# Patient Record
Sex: FEMALE | Race: OTHER | HISPANIC OR LATINO | Employment: OTHER | URBAN - METROPOLITAN AREA
[De-identification: names, ages, dates, MRNs, and addresses within clinical notes are randomized per-mention and may not be internally consistent; named-entity substitution may affect disease eponyms.]

---

## 2019-02-11 ENCOUNTER — HOSPITAL ENCOUNTER (INPATIENT)
Facility: HOSPITAL | Age: 44
LOS: 2 days | Discharge: HOME | DRG: 811 | End: 2019-02-13
Attending: EMERGENCY MEDICINE | Admitting: INTERNAL MEDICINE
Payer: COMMERCIAL

## 2019-02-11 ENCOUNTER — APPOINTMENT (EMERGENCY)
Dept: RADIOLOGY | Facility: HOSPITAL | Age: 44
DRG: 811 | End: 2019-02-11
Payer: COMMERCIAL

## 2019-02-11 DIAGNOSIS — J45.901 REACTIVE AIRWAY DISEASE WITH ACUTE EXACERBATION, UNSPECIFIED ASTHMA SEVERITY, UNSPECIFIED WHETHER PERSISTENT: ICD-10-CM

## 2019-02-11 DIAGNOSIS — R06.00 DYSPNEA: ICD-10-CM

## 2019-02-11 DIAGNOSIS — T78.2XXA ANAPHYLAXIS: Primary | ICD-10-CM

## 2019-02-11 LAB
ALBUMIN SERPL BCP-MCNC: 4.1 G/DL (ref 3.5–5)
ALP SERPL-CCNC: 89 U/L (ref 46–116)
ALT SERPL W P-5'-P-CCNC: 31 U/L (ref 12–78)
ANION GAP SERPL CALCULATED.3IONS-SCNC: 8 MMOL/L (ref 4–13)
AST SERPL W P-5'-P-CCNC: 15 U/L (ref 5–45)
ATRIAL RATE: 131 BPM
BASE EX.OXY STD BLDV CALC-SCNC: 79.1 % (ref 60–80)
BASE EX.OXY STD BLDV CALC-SCNC: 80.1 % (ref 60–80)
BASE EXCESS BLDA CALC-SCNC: 1 MMOL/L (ref -2–3)
BASE EXCESS BLDV CALC-SCNC: -0.6 MMOL/L
BASE EXCESS BLDV CALC-SCNC: -4.1 MMOL/L
BASOPHILS # BLD AUTO: 0.03 THOUSANDS/ΜL (ref 0–0.1)
BASOPHILS NFR BLD AUTO: 0 % (ref 0–1)
BILIRUB SERPL-MCNC: 0.27 MG/DL (ref 0.2–1)
BILIRUB UR QL STRIP: NEGATIVE
BUN SERPL-MCNC: 13 MG/DL (ref 5–25)
CA-I BLD-SCNC: 1.17 MMOL/L (ref 1.12–1.32)
CALCIUM SERPL-MCNC: 9.1 MG/DL (ref 8.3–10.1)
CHLORIDE SERPL-SCNC: 103 MMOL/L (ref 100–108)
CLARITY UR: CLEAR
CO2 SERPL-SCNC: 27 MMOL/L (ref 21–32)
COLOR UR: YELLOW
COLOR, POC: 0
CREAT SERPL-MCNC: 1.15 MG/DL (ref 0.6–1.3)
EOSINOPHIL # BLD AUTO: 0 THOUSAND/ΜL (ref 0–0.61)
EOSINOPHIL NFR BLD AUTO: 0 % (ref 0–6)
ERYTHROCYTE [DISTWIDTH] IN BLOOD BY AUTOMATED COUNT: 13.8 % (ref 11.6–15.1)
EXT PREG TEST URINE: NORMAL
GFR SERPL CREATININE-BSD FRML MDRD: 58 ML/MIN/1.73SQ M
GLUCOSE SERPL-MCNC: 116 MG/DL (ref 65–140)
GLUCOSE SERPL-MCNC: 126 MG/DL (ref 65–140)
GLUCOSE UR STRIP-MCNC: NEGATIVE MG/DL
HCO3 BLDA-SCNC: 27 MMOL/L (ref 24–30)
HCO3 BLDV-SCNC: 23 MMOL/L (ref 24–30)
HCO3 BLDV-SCNC: 26.5 MMOL/L (ref 24–30)
HCT VFR BLD AUTO: 47.4 % (ref 34.8–46.1)
HCT VFR BLD CALC: 48 % (ref 34.8–46.1)
HGB BLD-MCNC: 15.2 G/DL (ref 11.5–15.4)
HGB BLDA-MCNC: 16.3 G/DL (ref 11.5–15.4)
HGB UR QL STRIP.AUTO: NEGATIVE
IMM GRANULOCYTES # BLD AUTO: 0.02 THOUSAND/UL (ref 0–0.2)
IMM GRANULOCYTES NFR BLD AUTO: 0 % (ref 0–2)
KETONES UR STRIP-MCNC: NEGATIVE MG/DL
LEUKOCYTE ESTERASE UR QL STRIP: NEGATIVE
LYMPHOCYTES # BLD AUTO: 5.42 THOUSANDS/ΜL (ref 0.6–4.47)
LYMPHOCYTES NFR BLD AUTO: 61 % (ref 14–44)
MCH RBC QN AUTO: 28.1 PG (ref 26.8–34.3)
MCHC RBC AUTO-ENTMCNC: 32.1 G/DL (ref 31.4–37.4)
MCV RBC AUTO: 88 FL (ref 82–98)
MONOCYTES # BLD AUTO: 0.62 THOUSAND/ΜL (ref 0.17–1.22)
MONOCYTES NFR BLD AUTO: 7 % (ref 4–12)
NEUTROPHILS # BLD AUTO: 2.89 THOUSANDS/ΜL (ref 1.85–7.62)
NEUTS SEG NFR BLD AUTO: 32 % (ref 43–75)
NITRITE UR QL STRIP: NEGATIVE
NRBC BLD AUTO-RTO: 0 /100 WBCS
O2 CT BLDV-SCNC: 17.6 ML/DL
O2 CT BLDV-SCNC: 17.6 ML/DL
P AXIS: 126 DEGREES
PCO2 BLD: 28 MMOL/L (ref 21–32)
PCO2 BLD: 45.5 MM HG (ref 42–50)
PCO2 BLDV: 49.2 MM HG (ref 42–50)
PCO2 BLDV: 52.4 MM HG (ref 42–50)
PH BLD: 7.38 [PH] (ref 7.3–7.4)
PH BLDV: 7.29 [PH] (ref 7.3–7.4)
PH BLDV: 7.32 [PH] (ref 7.3–7.4)
PH UR STRIP.AUTO: 6 [PH] (ref 4.5–8)
PLATELET # BLD AUTO: 330 THOUSANDS/UL (ref 149–390)
PMV BLD AUTO: 9 FL (ref 8.9–12.7)
PO2 BLD: 41 MM HG (ref 35–45)
PO2 BLDV: 48.3 MM HG (ref 35–45)
PO2 BLDV: 51.1 MM HG (ref 35–45)
POTASSIUM BLD-SCNC: 2.7 MMOL/L (ref 3.5–5.3)
POTASSIUM SERPL-SCNC: 2.7 MMOL/L (ref 3.5–5.3)
PR INTERVAL: 132 MS
PROT SERPL-MCNC: 8.8 G/DL (ref 6.4–8.2)
PROT UR STRIP-MCNC: NEGATIVE MG/DL
QRS AXIS: 211 DEGREES
QRSD INTERVAL: 84 MS
QT INTERVAL: 302 MS
QTC INTERVAL: 445 MS
RBC # BLD AUTO: 5.4 MILLION/UL (ref 3.81–5.12)
SAO2 % BLD FROM PO2: 75 % (ref 95–98)
SODIUM BLD-SCNC: 142 MMOL/L (ref 136–145)
SODIUM SERPL-SCNC: 138 MMOL/L (ref 136–145)
SP GR UR STRIP.AUTO: <=1.005 (ref 1–1.03)
SPECIMEN SOURCE: ABNORMAL
T WAVE AXIS: 122 DEGREES
TROPONIN I SERPL-MCNC: <0.02 NG/ML
UROBILINOGEN UR QL STRIP.AUTO: 0.2 E.U./DL
VENTRICULAR RATE: 131 BPM
WBC # BLD AUTO: 8.98 THOUSAND/UL (ref 4.31–10.16)

## 2019-02-11 PROCEDURE — 82947 ASSAY GLUCOSE BLOOD QUANT: CPT

## 2019-02-11 PROCEDURE — 85025 COMPLETE CBC W/AUTO DIFF WBC: CPT | Performed by: EMERGENCY MEDICINE

## 2019-02-11 PROCEDURE — 82803 BLOOD GASES ANY COMBINATION: CPT

## 2019-02-11 PROCEDURE — 93005 ELECTROCARDIOGRAM TRACING: CPT

## 2019-02-11 PROCEDURE — 93010 ELECTROCARDIOGRAM REPORT: CPT | Performed by: INTERNAL MEDICINE

## 2019-02-11 PROCEDURE — 96360 HYDRATION IV INFUSION INIT: CPT

## 2019-02-11 PROCEDURE — 71045 X-RAY EXAM CHEST 1 VIEW: CPT

## 2019-02-11 PROCEDURE — 82805 BLOOD GASES W/O2 SATURATION: CPT | Performed by: EMERGENCY MEDICINE

## 2019-02-11 PROCEDURE — 36415 COLL VENOUS BLD VENIPUNCTURE: CPT | Performed by: EMERGENCY MEDICINE

## 2019-02-11 PROCEDURE — 84484 ASSAY OF TROPONIN QUANT: CPT | Performed by: EMERGENCY MEDICINE

## 2019-02-11 PROCEDURE — 82330 ASSAY OF CALCIUM: CPT

## 2019-02-11 PROCEDURE — 85014 HEMATOCRIT: CPT

## 2019-02-11 PROCEDURE — 96374 THER/PROPH/DIAG INJ IV PUSH: CPT

## 2019-02-11 PROCEDURE — 94644 CONT INHLJ TX 1ST HOUR: CPT

## 2019-02-11 PROCEDURE — 84132 ASSAY OF SERUM POTASSIUM: CPT

## 2019-02-11 PROCEDURE — 99285 EMERGENCY DEPT VISIT HI MDM: CPT

## 2019-02-11 PROCEDURE — 81003 URINALYSIS AUTO W/O SCOPE: CPT

## 2019-02-11 PROCEDURE — 84295 ASSAY OF SERUM SODIUM: CPT

## 2019-02-11 PROCEDURE — 81025 URINE PREGNANCY TEST: CPT | Performed by: EMERGENCY MEDICINE

## 2019-02-11 PROCEDURE — 80053 COMPREHEN METABOLIC PANEL: CPT | Performed by: EMERGENCY MEDICINE

## 2019-02-11 PROCEDURE — 94660 CPAP INITIATION&MGMT: CPT

## 2019-02-11 PROCEDURE — 94760 N-INVAS EAR/PLS OXIMETRY 1: CPT

## 2019-02-11 PROCEDURE — 81002 URINALYSIS NONAUTO W/O SCOPE: CPT | Performed by: EMERGENCY MEDICINE

## 2019-02-11 RX ORDER — SODIUM CHLORIDE FOR INHALATION 0.9 %
3 VIAL, NEBULIZER (ML) INHALATION ONCE
Status: COMPLETED | OUTPATIENT
Start: 2019-02-11 | End: 2019-02-11

## 2019-02-11 RX ADMIN — FAMOTIDINE 20 MG: 10 INJECTION, SOLUTION INTRAVENOUS at 22:31

## 2019-02-11 RX ADMIN — ISODIUM CHLORIDE 3 ML: 0.03 SOLUTION RESPIRATORY (INHALATION) at 22:26

## 2019-02-11 RX ADMIN — SODIUM CHLORIDE 1000 ML: 0.9 INJECTION, SOLUTION INTRAVENOUS at 23:04

## 2019-02-11 RX ADMIN — ALBUTEROL SULFATE 10 MG: 2.5 SOLUTION RESPIRATORY (INHALATION) at 22:26

## 2019-02-11 RX ADMIN — IPRATROPIUM BROMIDE 1 MG: 0.5 SOLUTION RESPIRATORY (INHALATION) at 22:26

## 2019-02-12 PROBLEM — F41.9 ANXIETY: Status: ACTIVE | Noted: 2019-02-12

## 2019-02-12 PROBLEM — J45.901 REACTIVE AIRWAY DISEASE WITH ACUTE EXACERBATION: Status: ACTIVE | Noted: 2019-02-12

## 2019-02-12 PROBLEM — I10 ESSENTIAL HYPERTENSION: Status: ACTIVE | Noted: 2019-02-12

## 2019-02-12 PROBLEM — T78.3XXA ANGIOEDEMA: Status: ACTIVE | Noted: 2019-02-12

## 2019-02-12 LAB
ANION GAP SERPL CALCULATED.3IONS-SCNC: 10 MMOL/L (ref 4–13)
BASOPHILS # BLD AUTO: 0.01 THOUSANDS/ΜL (ref 0–0.1)
BASOPHILS NFR BLD AUTO: 0 % (ref 0–1)
BUN SERPL-MCNC: 14 MG/DL (ref 5–25)
CALCIUM SERPL-MCNC: 8.9 MG/DL (ref 8.3–10.1)
CHLORIDE SERPL-SCNC: 106 MMOL/L (ref 100–108)
CO2 SERPL-SCNC: 22 MMOL/L (ref 21–32)
CREAT SERPL-MCNC: 1.17 MG/DL (ref 0.6–1.3)
EOSINOPHIL # BLD AUTO: 0 THOUSAND/ΜL (ref 0–0.61)
EOSINOPHIL NFR BLD AUTO: 0 % (ref 0–6)
ERYTHROCYTE [DISTWIDTH] IN BLOOD BY AUTOMATED COUNT: 13.8 % (ref 11.6–15.1)
EST. AVERAGE GLUCOSE BLD GHB EST-MCNC: 134 MG/DL
GFR SERPL CREATININE-BSD FRML MDRD: 57 ML/MIN/1.73SQ M
GLUCOSE SERPL-MCNC: 149 MG/DL (ref 65–140)
HBA1C MFR BLD: 6.3 % (ref 4.2–6.3)
HCT VFR BLD AUTO: 45.2 % (ref 34.8–46.1)
HGB BLD-MCNC: 14.3 G/DL (ref 11.5–15.4)
IMM GRANULOCYTES # BLD AUTO: 0.03 THOUSAND/UL (ref 0–0.2)
IMM GRANULOCYTES NFR BLD AUTO: 0 % (ref 0–2)
LYMPHOCYTES # BLD AUTO: 0.61 THOUSANDS/ΜL (ref 0.6–4.47)
LYMPHOCYTES NFR BLD AUTO: 6 % (ref 14–44)
MAGNESIUM SERPL-MCNC: 2.4 MG/DL (ref 1.6–2.6)
MCH RBC QN AUTO: 28 PG (ref 26.8–34.3)
MCHC RBC AUTO-ENTMCNC: 31.6 G/DL (ref 31.4–37.4)
MCV RBC AUTO: 89 FL (ref 82–98)
MONOCYTES # BLD AUTO: 0.06 THOUSAND/ΜL (ref 0.17–1.22)
MONOCYTES NFR BLD AUTO: 1 % (ref 4–12)
NEUTROPHILS # BLD AUTO: 9.56 THOUSANDS/ΜL (ref 1.85–7.62)
NEUTS SEG NFR BLD AUTO: 93 % (ref 43–75)
NRBC BLD AUTO-RTO: 0 /100 WBCS
PHOSPHATE SERPL-MCNC: 1.9 MG/DL (ref 2.7–4.5)
PLATELET # BLD AUTO: 295 THOUSANDS/UL (ref 149–390)
PMV BLD AUTO: 9.5 FL (ref 8.9–12.7)
POTASSIUM SERPL-SCNC: 4.3 MMOL/L (ref 3.5–5.3)
RBC # BLD AUTO: 5.1 MILLION/UL (ref 3.81–5.12)
SODIUM SERPL-SCNC: 138 MMOL/L (ref 136–145)
WBC # BLD AUTO: 10.27 THOUSAND/UL (ref 4.31–10.16)

## 2019-02-12 PROCEDURE — 99254 IP/OBS CNSLTJ NEW/EST MOD 60: CPT | Performed by: INTERNAL MEDICINE

## 2019-02-12 PROCEDURE — 83036 HEMOGLOBIN GLYCOSYLATED A1C: CPT | Performed by: INTERNAL MEDICINE

## 2019-02-12 PROCEDURE — 80048 BASIC METABOLIC PNL TOTAL CA: CPT | Performed by: INTERNAL MEDICINE

## 2019-02-12 PROCEDURE — 85025 COMPLETE CBC W/AUTO DIFF WBC: CPT | Performed by: INTERNAL MEDICINE

## 2019-02-12 PROCEDURE — 94640 AIRWAY INHALATION TREATMENT: CPT

## 2019-02-12 PROCEDURE — 83735 ASSAY OF MAGNESIUM: CPT | Performed by: INTERNAL MEDICINE

## 2019-02-12 PROCEDURE — 99223 1ST HOSP IP/OBS HIGH 75: CPT | Performed by: INTERNAL MEDICINE

## 2019-02-12 PROCEDURE — 84100 ASSAY OF PHOSPHORUS: CPT | Performed by: INTERNAL MEDICINE

## 2019-02-12 PROCEDURE — 94760 N-INVAS EAR/PLS OXIMETRY 1: CPT

## 2019-02-12 PROCEDURE — 94762 N-INVAS EAR/PLS OXIMTRY CONT: CPT

## 2019-02-12 RX ORDER — AMLODIPINE BESYLATE 5 MG/1
1 TABLET ORAL DAILY
Status: ON HOLD | COMMUNITY
Start: 2018-09-10 | End: 2019-02-13 | Stop reason: SDUPTHER

## 2019-02-12 RX ORDER — AMITRIPTYLINE HYDROCHLORIDE 50 MG/1
1 TABLET, FILM COATED ORAL
Status: ON HOLD | COMMUNITY
Start: 2018-03-26 | End: 2019-02-13 | Stop reason: SDUPTHER

## 2019-02-12 RX ORDER — AMITRIPTYLINE HYDROCHLORIDE 50 MG/1
50 TABLET, FILM COATED ORAL
Status: DISCONTINUED | OUTPATIENT
Start: 2019-02-12 | End: 2019-02-13 | Stop reason: HOSPADM

## 2019-02-12 RX ORDER — POTASSIUM CHLORIDE 20 MEQ/1
40 TABLET, EXTENDED RELEASE ORAL ONCE
Status: COMPLETED | OUTPATIENT
Start: 2019-02-12 | End: 2019-02-12

## 2019-02-12 RX ORDER — SODIUM CHLORIDE FOR INHALATION 0.9 %
3 VIAL, NEBULIZER (ML) INHALATION
Status: DISCONTINUED | OUTPATIENT
Start: 2019-02-12 | End: 2019-02-13 | Stop reason: HOSPADM

## 2019-02-12 RX ORDER — AMLODIPINE BESYLATE 5 MG/1
5 TABLET ORAL DAILY
Status: DISCONTINUED | OUTPATIENT
Start: 2019-02-12 | End: 2019-02-13 | Stop reason: HOSPADM

## 2019-02-12 RX ORDER — PREDNISONE 20 MG/1
40 TABLET ORAL DAILY
Status: DISCONTINUED | OUTPATIENT
Start: 2019-02-13 | End: 2019-02-13 | Stop reason: HOSPADM

## 2019-02-12 RX ORDER — METHYLPREDNISOLONE SODIUM SUCCINATE 40 MG/ML
40 INJECTION, POWDER, LYOPHILIZED, FOR SOLUTION INTRAMUSCULAR; INTRAVENOUS EVERY 8 HOURS SCHEDULED
Status: DISCONTINUED | OUTPATIENT
Start: 2019-02-12 | End: 2019-02-12

## 2019-02-12 RX ORDER — FLUCONAZOLE 150 MG/1
150 TABLET ORAL ONCE
Status: CANCELLED | OUTPATIENT
Start: 2019-02-12 | End: 2019-02-12

## 2019-02-12 RX ORDER — FLUCONAZOLE 150 MG/1
1 TABLET ORAL ONCE
Status: ON HOLD | COMMUNITY
Start: 2018-03-26 | End: 2019-02-12 | Stop reason: HOSPADM

## 2019-02-12 RX ORDER — LISINOPRIL AND HYDROCHLOROTHIAZIDE 25; 20 MG/1; MG/1
20-25 TABLET ORAL ONCE
Status: ON HOLD | COMMUNITY
Start: 2018-01-19 | End: 2019-02-12

## 2019-02-12 RX ORDER — METHYLPREDNISOLONE SODIUM SUCCINATE 40 MG/ML
40 INJECTION, POWDER, LYOPHILIZED, FOR SOLUTION INTRAMUSCULAR; INTRAVENOUS ONCE
Status: COMPLETED | OUTPATIENT
Start: 2019-02-12 | End: 2019-02-12

## 2019-02-12 RX ORDER — ACETAMINOPHEN 325 MG/1
650 TABLET ORAL EVERY 6 HOURS PRN
Status: DISCONTINUED | OUTPATIENT
Start: 2019-02-12 | End: 2019-02-13 | Stop reason: HOSPADM

## 2019-02-12 RX ORDER — ONDANSETRON 2 MG/ML
4 INJECTION INTRAMUSCULAR; INTRAVENOUS EVERY 8 HOURS PRN
Status: DISCONTINUED | OUTPATIENT
Start: 2019-02-12 | End: 2019-02-13 | Stop reason: HOSPADM

## 2019-02-12 RX ORDER — DOCUSATE SODIUM 100 MG/1
100 CAPSULE, LIQUID FILLED ORAL 2 TIMES DAILY PRN
Status: DISCONTINUED | OUTPATIENT
Start: 2019-02-12 | End: 2019-02-13 | Stop reason: HOSPADM

## 2019-02-12 RX ORDER — MAGNESIUM HYDROXIDE/ALUMINUM HYDROXICE/SIMETHICONE 120; 1200; 1200 MG/30ML; MG/30ML; MG/30ML
15 SUSPENSION ORAL EVERY 6 HOURS PRN
Status: DISCONTINUED | OUTPATIENT
Start: 2019-02-12 | End: 2019-02-13 | Stop reason: HOSPADM

## 2019-02-12 RX ORDER — LEVALBUTEROL 1.25 MG/.5ML
1.25 SOLUTION, CONCENTRATE RESPIRATORY (INHALATION)
Status: DISCONTINUED | OUTPATIENT
Start: 2019-02-12 | End: 2019-02-13 | Stop reason: HOSPADM

## 2019-02-12 RX ADMIN — POTASSIUM CHLORIDE 40 MEQ: 1500 TABLET, EXTENDED RELEASE ORAL at 01:22

## 2019-02-12 RX ADMIN — ISODIUM CHLORIDE 3 ML: 0.03 SOLUTION RESPIRATORY (INHALATION) at 19:12

## 2019-02-12 RX ADMIN — DIBASIC SODIUM PHOSPHATE, MONOBASIC POTASSIUM PHOSPHATE AND MONOBASIC SODIUM PHOSPHATE 1 TABLET: 852; 155; 130 TABLET ORAL at 16:32

## 2019-02-12 RX ADMIN — LEVALBUTEROL 1.25 MG: 1.25 SOLUTION, CONCENTRATE RESPIRATORY (INHALATION) at 13:59

## 2019-02-12 RX ADMIN — ISODIUM CHLORIDE 3 ML: 0.03 SOLUTION RESPIRATORY (INHALATION) at 07:40

## 2019-02-12 RX ADMIN — ACETAMINOPHEN 650 MG: 325 TABLET, FILM COATED ORAL at 15:27

## 2019-02-12 RX ADMIN — ISODIUM CHLORIDE 3 ML: 0.03 SOLUTION RESPIRATORY (INHALATION) at 13:59

## 2019-02-12 RX ADMIN — LEVALBUTEROL 1.25 MG: 1.25 SOLUTION, CONCENTRATE RESPIRATORY (INHALATION) at 07:40

## 2019-02-12 RX ADMIN — ENOXAPARIN SODIUM 40 MG: 40 INJECTION SUBCUTANEOUS at 08:39

## 2019-02-12 RX ADMIN — METHYLPREDNISOLONE SODIUM SUCCINATE 40 MG: 40 INJECTION, POWDER, FOR SOLUTION INTRAMUSCULAR; INTRAVENOUS at 21:08

## 2019-02-12 RX ADMIN — METHYLPREDNISOLONE SODIUM SUCCINATE 40 MG: 40 INJECTION, POWDER, FOR SOLUTION INTRAMUSCULAR; INTRAVENOUS at 04:17

## 2019-02-12 RX ADMIN — LEVALBUTEROL 1.25 MG: 1.25 SOLUTION, CONCENTRATE RESPIRATORY (INHALATION) at 19:12

## 2019-02-12 RX ADMIN — DIBASIC SODIUM PHOSPHATE, MONOBASIC POTASSIUM PHOSPHATE AND MONOBASIC SODIUM PHOSPHATE 1 TABLET: 852; 155; 130 TABLET ORAL at 21:08

## 2019-02-12 RX ADMIN — DIBASIC SODIUM PHOSPHATE, MONOBASIC POTASSIUM PHOSPHATE AND MONOBASIC SODIUM PHOSPHATE 1 TABLET: 852; 155; 130 TABLET ORAL at 12:49

## 2019-02-12 RX ADMIN — AMITRIPTYLINE HYDROCHLORIDE 50 MG: 50 TABLET, FILM COATED ORAL at 21:08

## 2019-02-12 RX ADMIN — AMLODIPINE BESYLATE 5 MG: 5 TABLET ORAL at 08:39

## 2019-02-12 NOTE — RESPIRATORY THERAPY NOTE
RT Protocol Note  Herrera Leslie 39 y o  female MRN: [id number]  Unit/Bed#: [facility] 708-01 Encounter: [id number]    Assessment    Principal Problem:    Reactive airway disease with acute exacerbation  Active Problems:    Anxiety    Essential hypertension      Home Pulmonary Medications:  None       Past Medical History:   Diagnosis Date    Diabetes mellitus ([address] )     Hypertension      Social History     Socioeconomic History    Marital status: /Civil Union     Spouse name: None    Number of children: None    Years of education: None    Highest education level: None   Occupational History    None   Social Needs    Financial resource strain: None    Food insecurity:     Worry: None     Inability: None    Transportation needs:     Medical: None     Non-medical: None   Tobacco Use    Smoking status: Never Smoker    Smokeless tobacco: Never Used   Substance and Sexual Activity    Alcohol use: Not Currently     Frequency: Monthly or less     Binge frequency: Never    Drug use: Not Currently    Sexual activity: Not Currently   Lifestyle    Physical activity:     Days per week: None     Minutes per session: None    Stress: None   Relationships    Social connections:     Talks on phone: None     Gets together: None     Attends Protestant service: None     Active member of club or organization: None     Attends meetings of clubs or organizations: None     Relationship status: None    Intimate partner violence:     Fear of current or ex partner: None     Emotionally abused: None     Physically abused: None     Forced sexual activity: None   Other Topics Concern    None   Social History Narrative    None       Subjective         Objective    Physical Exam:   Assessment Type: Assess only  General Appearance: Awake, Alert  Respiratory Pattern: Normal  Chest Assessment: Chest expansion symmetrical  Bilateral Breath Sounds: Coarse  O2 Device: RA    Vitals:  Blood pressure 116/72, pulse (!) 123, temperature 98 6 °F (37 °C), temperature source Oral, resp  rate 20, height 5' 6" (1 676 m), weight 89 5 kg (197 lb 5 oz), SpO2 98 %  Imaging and other studies: I have personally reviewed pertinent reports  O2 Device: RA     Plan    Respiratory Plan: Mild Distress pathway        Resp Comments: (P) Respiratory protocol initiated at this time  Pt admitted for SOB  Pt states she does not have any history of COPD or asthma  Per chart pt admission diagnosis is reactive airway disease  Pt does not take any respiratory meds at home  Will order pt Xopenex and Saline TID  Will continue to monitor under protocol

## 2019-02-12 NOTE — ASSESSMENT & PLAN NOTE
Patient started on BiPAP with good results after nebulizations  However because of the history of swelling and reactive airway; might benefit from anti-inflammatory steroids  Will start methylprednisolone 40 mg every 8 hours  Patient does not have a past history of reactive airway or asthma or COPD or smoking; will get pulmonary consult for opinion  Nebulizations, respiratory support and protocol, methylprednisolone as discussed and oxygen supplementation  As a note, the patient's medications from [facility] sources are currently denied being taken by patient  These include metformin in addition to lisinopril/hydrochlorothiazide and other medications for overactive bladder; while not part of the list of drugs for med rec, at least, will check hemoglobin A1c and give patient diabetic diet especially since patient is to be started on methylprednisolone  In the event that sugars are high or A1c is elevated; might need to start sliding scale

## 2019-02-12 NOTE — CONSULTS
Pulmonary Consultation   Herrera Leslie 39 y o  female MRN: [id number]  Unit/Bed#: [facility] 708-01 Encounter: [id number]      Reason for consultation: reactive airways    Requesting physician: Deborah Grey MD    Impressions/Plan:   1  Acute bronchospasm vs possible reactive airways        *  Overall, improving with steroids and nebulizers        *  Decrease solumedrol to Q12hrs & start prednisone in the AM           *  Plan to taper by 10mg every 3 days        *  Continue Xopenex TID  2  Viral URI        *  Supportive care  3  Probable GUILLE        *  Gives history for GUILLE  Would benefit from outpatient PSG    ~Outpatient pulmonary follow up pending hospital course  ~May also benefit from referral to allergist  ~Incentive spirometry Q1hr, OOB as able, increase activity as able    History of Present Illness   HPI:  Herrera Leslie is a 39 y o  female who presented to the emergency room last evening with a 2 day history of increasing cough and bronchospasm  Chest complained of postnasal drip he  Yesterday she began to get extremely short of breath and felt as though her tongue was swelling along with her eyes and this prompted her to come to the emergency room  Upon initial evaluation she was placed on BiPAP with improvement in her work of breathing  She was given steroids and bronchodilators  This morning, she is feeling much better from a pulmonary standpoint and is nearing her baseline  She is comfortable on room air and notes improvement in her bronchospasm  She continues to have cough that is occasionally productive of clear to yellow sputum  She has been afebrile and denies any recent travel or sick contacts  She has no underlying pulmonary history and has never been evaluated by a pulmonologist in the past   She does not take medications from a pulmonary standpoint at baseline  She has never had any issues with her breathing until 2 days ago  She is a never smoker and denies any occupational exposures    She denies any allergies that she is aware of and has never been seen by an allergist   Due to her bronchospasm and respiratory distress, a pulmonary consultation has been requested  Of note, she does state that she snores and wakes up occasionally with morning headaches and notes daytime lethargy  Review of systems:  12 point review of systems was completed and was otherwise negative except as listed in HPI  Historical Information   Past Medical History:   Diagnosis Date    Diabetes mellitus ([address] )     Hypertension      Past Surgical History:   Procedure Laterality Date    KIDNEY STONE SURGERY       Family History   Family history unknown: Yes       Occupational history: Works as an     No known occupational exposures    Tobacco history: Never smoker    Meds/Allergies   Current Facility-Administered Medications   Medication Dose Route Frequency    aluminum-magnesium hydroxide-simethicone (MYLANTA) 200-200-20 mg/5 mL oral suspension 15 mL  15 mL Oral Q6H PRN    amitriptyline (ELAVIL) tablet 50 mg  50 mg Oral HS    amLODIPine (NORVASC) tablet 5 mg  5 mg Oral Daily    docusate sodium (COLACE) capsule 100 mg  100 mg Oral BID PRN    enoxaparin (LOVENOX) subcutaneous injection 40 mg  40 mg Subcutaneous Daily    levalbuterol (XOPENEX) inhalation solution 1 25 mg  1 25 mg Nebulization TID    And    sodium chloride 0 9 % inhalation solution 3 mL  3 mL Nebulization TID    methylPREDNISolone sodium succinate (Solu-MEDROL) injection 40 mg  40 mg Intravenous Q8H [facility]    ondansetron (ZOFRAN) injection 4 mg  4 mg Intravenous Q8H PRN    potassium-sodium phosphateS (K-PHOS,PHOSPHA 250) -250 mg tablet 1 tablet  1 tablet Oral 4x Daily (with meals and at bedtime)     Medications Prior to Admission   Medication    amitriptyline (ELAVIL) 50 mg tablet    amLODIPine (NORVASC) 5 mg tablet     Allergies   Allergen Reactions    Lisinopril Anaphylaxis and Angioedema       Vitals: Blood pressure 139/96, pulse 99, temperature 98 6 °F (37 °C), temperature source Oral, resp  rate 18, height 5' 6" (1 676 m), weight 85 4 kg (188 lb 3 oz), SpO2 96 % , RA, Body mass index is 30 37 kg/m²  Intake/Output Summary (Last 24 hours) at 2/12/2019 1229  Last data filed at 2/12/2019 9784  Gross per 24 hour   Intake 1120 ml   Output --   Net 1120 ml       Physical exam:    General Appearance:    Alert, cooperative, no conversational dyspnea or accessory     muscle use       Head/eyes:    Normocephalic, without obvious abnormality, atraumatic,         PERRL, extraocular muscles intact, no scleral icterus    Nose:   Nares normal, septum midline, mucosa normal, no drainage    or sinus tenderness   Throat:   Moist mucous membranes, no thrush   Neck:   Supple, trachea midline, no adenopathy; no carotid    bruit or JVD   Lungs:     Decreased breath sounds at the bases but otherwise fairly clear without wheezes, rhonchi or rales at present   Chest Wall:    No tenderness or deformity    Heart:    Regular rate and rhythm, S1 and S2 normal, no murmur, rub   or gallop   Abdomen:     Soft, non-tender, bowel sounds active all four quadrants,     no masses, no organomegaly   Extremities:   Extremities normal, atraumatic, no cyanosis or edema   Skin:   Warm, dry, turgor normal, no rashes or lesions   Lymph nodes:   Cervical and supraclavicular nodes normal   Neurologic:   CNII-XII intact, normal strength, non-focal         Labs: I have personally reviewed pertinent lab results  , ABG: No results found for: PHART, ZNI1EWB, PO2ART, KJN3HQU, D9CAHGSA, BEART, SOURCE, BNP: No results found for: BNP, CBC:   Lab Results   Component Value Date    WBC 10 27 (H) 02/12/2019    HGB 14 3 02/12/2019    HCT 45 2 02/12/2019    MCV 89 02/12/2019     02/12/2019    MCH 28 0 02/12/2019    MCHC 31 6 02/12/2019    RDW 13 8 02/12/2019    MPV 9 5 02/12/2019    NRBC 0 02/12/2019   , CMP:   Lab Results   Component Value Date    SODIUM 138 02/12/2019    K 4 3 02/12/2019     02/12/2019    CO2 22 02/12/2019    CO2 28 02/11/2019    BUN 14 02/12/2019    CREATININE 1 17 02/12/2019    GLUCOSE 126 02/11/2019    CALCIUM 8 9 02/12/2019    AST 15 02/11/2019    ALT 31 02/11/2019    ALKPHOS 89 02/11/2019    EGFR 57 02/12/2019   , PT/INR: No results found for: PT, INR, Troponin:   Lab Results   Component Value Date    TROPONINI <0 02 02/11/2019       Imaging and other studies: I have personally reviewed pertinent films in PACS     [facility] 2/11/19  Bibasilar atelectasis  No consolidations or effusions noted  Pulmonary function testing: No PFTs to be reviewed    EKG, Pathology, and Other Studies: I have personally reviewed pertinent reports        Code Status: Level 1 - Full Code      Royden Lefort, PA-C

## 2019-02-12 NOTE — PLAN OF CARE
Problem: DISCHARGE PLANNING - CARE MANAGEMENT  Goal: Discharge to post-acute care or home with appropriate resources  Description  INTERVENTIONS:  - Conduct assessment to determine patient/family and health care team treatment goals, and need for post-acute services based on payer coverage, community resources, and patient preferences, and barriers to discharge  - Address psychosocial, clinical, and financial barriers to discharge as identified in assessment in conjunction with the patient/family and health care team  - Arrange appropriate level of post-acute services according to patient's   needs and preference and payer coverage in collaboration with the physician and health care team  - Communicate with and update the patient/family, physician, and health care team regarding progress on the discharge plan  - Arrange appropriate transportation to post-acute venues  - Plan for discharge to home     Outcome: Progressing

## 2019-02-12 NOTE — ED NOTES
RT at bedside  Dr Ruchi Alvarado and Dr Lisa Mcgill at bedside            Rory Foley RN  02/11/19 2195

## 2019-02-12 NOTE — SOCIAL WORK
Met with pt to discuss the role of CM and to discuss any help pt may need prior to dc  Pt lives with her  Magnolia Mayen in a 1st floor home with 4 NISHA  Pt performed ADL's indptly pta, no use of DME  No hx of HHC  No hx of mental health or D&A treatment  Pt's preferred pharmacy is [facility] on [address] in [facility]  Pt's PCP is Dr Ami Irizarry  Contact: Magnolia Mayen () [phone]  Pt states her son will transport home at dc  CM reviewed d/c planning process including the following: identifying help at home, patient preference for d/c planning needs, Discharge Lounge, [facility] Meds to Bed program, availability of treatment team to discuss questions or concerns patient and/or family may have regarding understanding medications and recognizing signs and symptoms once discharged  CM also encouraged patient to follow up with all recommended appointments after discharge  Patient advised of importance for patient and family to participate in managing patients medical well being  Patient/caregiver received discharge checklist  Content reviewed  Patient/caregiver encouraged to participate in discharge plan of care prior to discharge home

## 2019-02-12 NOTE — ED NOTES
Pt taken off of BiPAP by request of Dr Deedee Escoto  Pt resting comfortably in the bed and maintaining oxygen saturations 94% without the aide of supplemental oxygen  Will continue to monitor       Mustapha Hinojosa RN  02/12/19 0000

## 2019-02-12 NOTE — ED NOTES
Attempted to call report per RN putting in IV and would call back        Maribell Rm RN  02/12/19 4671

## 2019-02-12 NOTE — H&P
H&P- Yaw Vernon 1975, 39 y o  female MRN: [id number]    Unit/Bed#: ED 16 Encounter: [id number]    Primary Care Provider: Wing Luca DO   Date and time admitted to hospital: 2/11/2019 10:12 PM        * Reactive airway disease with acute exacerbation  Assessment & Plan  Patient started on BiPAP with good results after nebulizations  However because of the history of swelling and reactive airway; might benefit from anti-inflammatory steroids  Will start methylprednisolone 40 mg every 8 hours  Patient does not have a past history of reactive airway or asthma or COPD or smoking; will get pulmonary consult for opinion  Nebulizations, respiratory support and protocol, methylprednisolone as discussed and oxygen supplementation  As a note, the patient's medications from [facility] sources are currently denied being taken by patient  These include metformin in addition to lisinopril/hydrochlorothiazide and other medications for overactive bladder; while not part of the list of drugs for med rec, at least, will check hemoglobin A1c and give patient diabetic diet especially since patient is to be started on methylprednisolone  In the event that sugars are high or A1c is elevated; might need to start sliding scale  Essential hypertension  Assessment & Plan  Continue Norvasc  Anxiety  Assessment & Plan  Continue Elavil  VTE Prophylaxis: Enoxaparin (Lovenox)  / sequential compression device   Code Status: No Order full Code as discussed with patient  POLST: There is no POLST form on file for this patient (pre-hospital)    Anticipated Length of Stay:  Patient will be admitted on an Inpatient basis with an anticipated length of stay of  greater than 2 midnights  Justification for Hospital Stay: Please see detailed plans noted above      Chief Complaint:     Increasing shortness of breath  History of Present Illness:  Yaw Vernon is a 39 y o  female who has past medical history as per patient of benign essential hypertension, candidiasis, and depression  Curiously, the patient also has other medications in the [place] however denies ever taking them including lisinopril/hydrochlorothiazide, metformin, cetirizine  She denies history of asthma nor smoking or COPD  She also claims not to have had any history of childhood reactive airway disease or visits to the emergency room for shortness of breath as a pediatric patient  However over the past 2 days she was complaining of upper respiratory nasal symptoms with postnasal drip  Tonight instantaneously she became short of breath with note of enlargement of tongue  She denies any new foods or any new substances  Or new medications negative  With extreme shortness of breath, she was then started on nebulizations as well as BiPAP with good resolution  She now continues to breathe through the BiPAP and able to speak in longer sentences  There is also note of abdominal breathing but otherwise she feels much better with the ability to speak in longer sentences  Thinking it may be anaphylaxis, she was then started on Pepcid injection  She was given albuterol/Atrovent nebulization  Review of Systems:    Constitutional:  Denies fever or chills   Eyes:  Denies change in visual acuity   HENT:  Nasal congestion, postnasal drip  Respiratory:  Denies cough or shortness of breath   Cardiovascular:  Denies chest pain or edema   GI:  Denies abdominal pain, nausea, vomiting, bloody stools or diarrhea   :  Denies dysuria   Musculoskeletal:  Denies back pain or joint pain   Integument:  Denies rash   Neurologic:  Denies headache, focal weakness or sensory changes   Endocrine:  Denies polyuria or polydipsia   Lymphatic:  Denies swollen glands   Psychiatric:  Denies depression or anxiety     Past Medical and Surgical History:   Past Medical History:   Diagnosis Date    Hypertension      History reviewed   No pertinent surgical history  Meds/Allergies:    (Not in a hospital admission)  amitriptyline (ELAVIL) 50 mg tablet Take 1 tablet by mouth daily at bedtime Anselmo Babcock MD Reordered   Ordered as: amitriptyline (ELAVIL) tablet 50 mg - 50 mg, Oral, Daily at bedtime, First dose on Tue 2/12/19 at 0015   amLODIPine (NORVASC) 5 mg tablet Take 1 tablet by mouth daily Anselmo Babcock MD Reordered   Ordered as: amLODIPine (NORVASC) tablet 5 mg - 5 mg, Oral, Daily, First dose on Tue 2/12/19 at [address] [facility] for systolic blood pressure less than (mmHg): 110   fluconazole (DIFLUCAN) 150 mg tablet Take 1 tablet by mouth once Anselmo Babcock MD Reordered   Ordered as: fluconazole (DIFLUCAN) tablet 150 mg - 150 mg, Oral, Once, Tue 2/12/19 at [place] [address], For 1 dose Hazardous agent; use appropriate precautions for handling and disposal  L[employer] ALIKE [employer] ALIKE MED      Other medications that I found in [facility] records that she denies ever taking them:  Toradol, lisinopril/hydrochlorothiazide, metformin, oxybutynin, Percocet, Pyridium, Bactrim  Allergies: No Known Allergies  History:  Marital Status: /Civil Union   Occupation:  [employer]   Patient Pre-hospital Living Situation:  Lives at home with kids the youngest of which is in her teen; no one is sick in the house  Patient Pre-hospital Level of Mobility:  Ambulatory  Patient Pre-hospital Diet Restrictions:  Regular diet  Substance Use History:   Social History     Substance and Sexual Activity   Alcohol Use Not on file     Social History     Tobacco Use   Smoking Status Not on file     Social History     Substance and Sexual Activity   Drug Use Not on file       Family History:  History reviewed  No pertinent family history      Physical Exam:     Vitals:   Blood Pressure: 153/72 (02/11/19 2249)  Pulse: (!) 129 (02/11/19 2249)  Temperature: 98 3 °F (36 8 °C) (02/11/19 2235)  Temp Source: Axillary (02/11/19 2235)  Respirations: (!) 35 (02/11/19 2249)  Weight - Scale: 90 kg (198 lb 6 6 oz) (02/11/19 2215)  SpO2: 99 % (02/11/19 2249)    Constitutional:  Well developed, well nourished, no acute distress, non-toxic appearance   Eyes:  PERRL, conjunctiva normal   HENT:  Atraumatic, external ears normal, nose normal, oropharynx moist, no pharyngeal exudates  Neck- normal range of motion, no tenderness, supple   Respiratory:  Mild respiratory distress noted abdominal breathing, there fair breath sounds, no rales, wheezing is better  Cardiovascular:  Regular rhythm with tachycardia, sinus no murmurs, no gallops, no rubs   GI:  Soft, nondistended, normal bowel sounds, nontender, no organomegaly, no mass, no rebound, no guarding   :  No costovertebral angle tenderness   Musculoskeletal:  No edema, no tenderness, no deformities  Back- no tenderness  Integument:  Well hydrated, no rash   Lymphatic:  No lymphadenopathy noted   Neurologic:  Alert &awake, communicative, CN 2-12 normal, normal motor function, normal sensory function, no focal deficits noted   Psychiatric:  Speech and behavior appropriate       Lab Results: I have personally reviewed pertinent reports        Results from last 7 days   Lab Units 02/11/19  2225   WBC Thousand/uL 8 98   HEMOGLOBIN g/dL 15 2   HEMATOCRIT % 47 4*   PLATELETS Thousands/uL 330   NEUTROS PCT % 32*   LYMPHS PCT % 61*   MONOS PCT % 7   EOS PCT % 0     Results from last 7 days   Lab Units 02/11/19  2225 02/11/19  2221   POTASSIUM mmol/L 2 7*  --    CHLORIDE mmol/L 103  --    CO2 mmol/L 27  --    CO2, I-STAT mmol/L  --  28   BUN mg/dL 13  --    CREATININE mg/dL 1 15  --    CALCIUM mg/dL 9 1  --    ALK PHOS U/L 89  --    ALT U/L 31  --    AST U/L 15  --    GLUCOSE, ISTAT mg/dl  --  126       EKG:  Suspect arm lead reversal, interpretation assumes no reversal   Age and gender specific ECG analysis   Unusual P axis, possible ectopic atrial tachycardia  Lateral infarct , age undetermined  Abnormal ECG  No previous ECGs available  Confirmed by John Turpin ([id number]) on 2/11/2019 10:22:42 PM  (Please note that patient is not complaining of chest discomfort )  Imaging: I have personally reviewed pertinent reports  Chest x-ray personally reviewed:  Airspace disease with no distinctive infiltrate  No results found  ** Please Note: Dragon 360 Dictation voice to text software was used in the creation of this document   **

## 2019-02-12 NOTE — ED ATTENDING ATTESTATION
Vic George MD, saw and evaluated the patient  I have discussed the patient with the resident/non-physician practitioner and agree with the resident's/non-physician practitioner's findings, Plan of Care, and MDM as documented in the resident's/non-physician practitioner's note, except where noted  All available labs and Radiology studies were reviewed  At this point I agree with the current assessment done in the Emergency Department  I have conducted an independent evaluation of this patient including a focused history and a physical exam     50-year-old female, presenting to the emergency department for evaluation anaphylaxis  Patient reportedly had some shortness of breath yesterday, and called EMS because difficulty with taking a deep breath hand swallowing tonight  The patient was noted to have eye swelling and stridor on EMS arrival   The patient was treated with 2 doses of epinephrine, pre-hospital Benadryl, and pre-hospital steroids  Due to patient acuity, initially the history is unable to be obtained from the patient  Due to patient acuity, unable to initially perform review of systems  After the patient was stabilized, no further history was able to be added, and the patient had no further findings on review of systems  Ten systems were reviewed and were negative except as noted  On arrival to the emergency department the patient is noted to have some periorbital edema bilaterally  Oropharynx is moist and the patient has a Mallampati 2 airway  There is no noted uvula, lip, or tongue swelling  There is no stridor  The submental space is soft  The neck is supple without meningismus  Lungs sounds are diminished bilaterally  Heart is tachycardic and regular  Abdomen is obese, soft, nontender  Extremities without any significant edema  No calf tenderness  GCS is 15  Motor is 5/5 bilateral upper lower extremities      Patient was administered Heart neb with improvement and placed on BiPAP  Patient had multiple reassessments during her emergency department stay to reassess her airway and progression of airway  Patient was noted to be hypokalemic which is likely secondary to significant albuterol administration  Although the patient takes an ACE-inhibitor, her presentation with hives seems to be more consistent with acute allergic reaction with anaphylaxis  Due to the significant acuity, the patient will require admission to the hospital for observation      Labs Reviewed   CBC AND DIFFERENTIAL - Abnormal       Result Value Ref Range Status    WBC 8 98  4 31 - 10 16 Thousand/uL Final    RBC 5 40 (*) 3 81 - 5 12 Million/uL Final    Hemoglobin 15 2  11 5 - 15 4 g/dL Final    Hematocrit 47 4 (*) 34 8 - 46 1 % Final    MCV 88  82 - 98 fL Final    MCH 28 1  26 8 - 34 3 pg Final    MCHC 32 1  31 4 - 37 4 g/dL Final    RDW 13 8  11 6 - 15 1 % Final    MPV 9 0  8 9 - 12 7 fL Final    Platelets 121  141 - 390 Thousands/uL Final    nRBC 0  /100 WBCs Final    Neutrophils Relative 32 (*) 43 - 75 % Final    Immat GRANS % 0  0 - 2 % Final    Lymphocytes Relative 61 (*) 14 - 44 % Final    Monocytes Relative 7  4 - 12 % Final    Eosinophils Relative 0  0 - 6 % Final    Basophils Relative 0  0 - 1 % Final    Neutrophils Absolute 2 89  1 85 - 7 62 Thousands/µL Final    Immature Grans Absolute 0 02  0 00 - 0 20 Thousand/uL Final    Lymphocytes Absolute 5 42 (*) 0 60 - 4 47 Thousands/µL Final    Monocytes Absolute 0 62  0 17 - 1 22 Thousand/µL Final    Eosinophils Absolute 0 00  0 00 - 0 61 Thousand/µL Final    Basophils Absolute 0 03  0 00 - 0 10 Thousands/µL Final   COMPREHENSIVE METABOLIC PANEL - Abnormal    Sodium 138  136 - 145 mmol/L Final    Potassium 2 7 (*) 3 5 - 5 3 mmol/L Final    Chloride 103  100 - 108 mmol/L Final    CO2 27  21 - 32 mmol/L Final    ANION GAP 8  4 - 13 mmol/L Final    BUN 13  5 - 25 mg/dL Final    Creatinine 1 15  0 60 - 1 30 mg/dL Final    Comment: Standardized to IDMS reference method    Glucose 116  65 - 140 mg/dL Final    Comment:   If the patient is fasting, the ADA then defines impaired fasting glucose as > 100 mg/dL and diabetes as > or equal to 123 mg/dL  Specimen collection should occur prior to Sulfasalazine administration due to the potential for falsely depressed results  Specimen collection should occur prior to Sulfapyridine administration due to the potential for falsely elevated results  Calcium 9 1  8 3 - 10 1 mg/dL Final    AST 15  5 - 45 U/L Final    Comment:   Specimen collection should occur prior to Sulfasalazine administration due to the potential for falsely depressed results  ALT 31  12 - 78 U/L Final    Comment:   Specimen collection should occur prior to Sulfasalazine and/or Sulfapyridine administration due to the potential for falsely depressed results  Alkaline Phosphatase 89  46 - 116 U/L Final    Total Protein 8 8 (*) 6 4 - 8 2 g/dL Final    Albumin 4 1  3 5 - 5 0 g/dL Final    Total Bilirubin 0 27  0 20 - 1 00 mg/dL Final    eGFR 58  ml/min/1 73sq m Final    Narrative:     National Kidney Disease Education Program recommendations are as follows:  GFR calculation is accurate only with a steady state creatinine  Chronic Kidney disease less than 60 ml/min/1 73 sq  meters  Kidney failure less than 15 ml/min/1 73 sq  meters     BLOOD GAS, VENOUS - Abnormal    pH, Parth 7 321  7 300 - 7 400 Final    pCO2, Parth 52 4 (*) 42 0 - 50 0 mm Hg Final    pO2, Parth 48 3 (*) 35 0 - 45 0 mm Hg Final    HCO3, Parth 26 5  24 - 30 mmol/L Final    Base Excess, Parth -0 6  mmol/L Final    O2 Content, Parth 17 6  ml/dL Final    O2 HGB, VENOUS 79 1  60 0 - 80 0 % Final   BLOOD GAS, VENOUS - Abnormal    pH, Parth 7 287 (*) 7 300 - 7 400 Final    pCO2, Parth 49 2  42 0 - 50 0 mm Hg Final    pO2, Parth 51 1 (*) 35 0 - 45 0 mm Hg Final    HCO3, Parth 23 0 (*) 24 - 30 mmol/L Final    Base Excess, Parth -4 1  mmol/L Final    O2 Content, Parth 17 6  ml/dL Final    O2 HGB, VENOUS 80 1 (*) 60 0 - 80 0 % Final   POCT BLOOD GAS (CG8+) - Abnormal    ph, Parth ISTAT 7 381  7 300 - 7 400 Final    pCO2, Parth i-STAT 45 5  42 0 - 50 0 mm HG Final    pO2, Parth i-STAT 41 0  35 0 - 45 0 mm HG Final    BE, i-STAT 1  -2 - 3 mmol/L Final    HCO3, Parth i-STAT 27 0  24 0 - 30 0 mmol/L Final    CO2, i-STAT 28  21 - 32 mmol/L Final    O2 Sat, i-STAT 75 (*) 95 - 98 % Final    SODIUM, I-STAT 142  136 - 145 mmol/l Final    Potassium, i-STAT 2 7 (*) 3 5 - 5 3 mmol/L Final    Calcium, Ionized i-STAT 1 17  1 12 - 1 32 mmol/L Final    Hct, i-STAT 48 (*) 34 8 - 46 1 % Final    Hgb, i-STAT 16 3 (*) 11 5 - 15 4 g/dl Final    Glucose, i-STAT 126  65 - 140 mg/dl Final    Specimen Type VENOUS   Final   TROPONIN I - Normal    Troponin I <0 02  <=0 04 ng/mL Final    Comment:   [employer] Chemistry analyzer 99% cutoff is > 0 04 ng/mL in network labs     o cTnI 99% cutoff is useful only when applied to patients in the clinical setting of myocardial ischemia   o cTnI 99% cutoff should be interpreted in the context of clinical history, ECG findings and possibly cardiac imaging to establish correct diagnosis  o cTnI 99% cutoff may be suggestive but clearly not indicative of a coronary event without the clinical setting of myocardial ischemia       POCT PREGNANCY, URINE - Normal    EXT PREG TEST UR (Ref: Negative) neg   Final   POCT URINALYSIS DIPSTICK - Normal    Color, UA 0   Final   ED URINE MACROSCOPIC    Color, UA Yellow   Final    Clarity, UA Clear   Final    pH, UA 6 0  4 5 - 8 0 Final    Leukocytes, UA Negative  Negative Final    Nitrite, UA Negative  Negative Final    Protein, UA Negative  Negative mg/dl Final    Glucose, UA Negative  Negative mg/dl Final    Ketones, UA Negative  Negative mg/dl Final    Urobilinogen, UA 0 2  0 2, 1 0 E U /dl E U /dl Final    Bilirubin, UA Negative  Negative Final    Blood, UA Negative  Negative Final    Specific Gravity, UA <=1 005  1 003 - 1 030 Final    Narrative:     CLINITEK RESULT       XR chest 1 view portable   ED Interpretation   No acute pulmonary disease  Total critical care time spent in the initial evaluation and reassessment of this patient equals 38 minutes  This time was spent in placing the patient on BiPAP, initial assessment, multiple re-evaluations, following up on labs including multiple ABGs, following up on x-ray studies, and discussions with the admitting service

## 2019-02-12 NOTE — ASSESSMENT & PLAN NOTE
Patient started on BiPAP with good results after nebulizations  However because of the history of swelling and reactive airway; might benefit from anti-inflammatory steroids  Will start methylprednisolone 40 mg every 8 hours  Patient does not have a past history of reactive airway or asthma or COPD or smoking; will get pulmonary consult for opinion  Nebulizations, respiratory support and protocol, methylprednisolone as discussed and oxygen supplementation  This might angioedema as above, need to monitor for now  Hold off ACE inhibitor  Started amlodipine     As a note, the patient's medications from [facility] sources are currently denied being taken by patient  These include metformin in addition to lisinopril/hydrochlorothiazide and other medications for overactive bladder; while not part of the list of drugs for med rec, at least, will check hemoglobin A1c and give patient diabetic diet especially since patient is to be started on methylprednisolone  In the event that sugars are high or A1c is elevated; might need to start sliding scale

## 2019-02-12 NOTE — RESTORATIVE TECHNICIAN NOTE
Restorative Specialist Mobility Note       Activity: Ambulate in [place], Ambulate in room, Bathroom privileges, Stand at bedside, Dangle(Educated/encouraged pt to ambulate with assistance 3-4 x's/day   Pt callbell, phone/tray within reach )     Assistive Device: None       Abena CAMPOS, Restorative Technician, [facility]

## 2019-02-12 NOTE — PROGRESS NOTES
Progress Note - Fuentes Harvey 1975, 39 y o  female MRN: [id number]    Unit/Bed#: [facility] 708-01 Encounter: [id number]    Primary Care Provider: Migel Espino DO   Date and time admitted to hospital: 2/11/2019 10:12 PM        Angioedema  Assessment & Plan  Patient was on ACE inhibitor: Lisinopril  Possible reaction to ACE inhibitor  Continue monitoring overnight for any swelling and breathing issues  Pulmonary on board      Essential hypertension  Assessment & Plan  Continue Norvasc  Anxiety  Assessment & Plan  Continue Elavil  * Reactive airway disease with acute exacerbation  Assessment & Plan  Patient started on BiPAP with good results after nebulizations  However because of the history of swelling and reactive airway; might benefit from anti-inflammatory steroids  Will start methylprednisolone 40 mg every 8 hours  Patient does not have a past history of reactive airway or asthma or COPD or smoking; will get pulmonary consult for opinion  Nebulizations, respiratory support and protocol, methylprednisolone as discussed and oxygen supplementation  This might angioedema as above, need to monitor for now  Hold off ACE inhibitor  Started amlodipine     As a note, the patient's medications from [facility] sources are currently denied being taken by patient  These include metformin in addition to lisinopril/hydrochlorothiazide and other medications for overactive bladder; while not part of the list of drugs for med rec, at least, will check hemoglobin A1c and give patient diabetic diet especially since patient is to be started on methylprednisolone  In the event that sugars are high or A1c is elevated; might need to start sliding scale  VTE Pharmacologic Prophylaxis:   Pharmacologic: Enoxaparin (Lovenox)  Mechanical VTE Prophylaxis in Place: Yes    Patient Centered Rounds: I have performed bedside rounds with nursing staff today      Discussions with Specialists or Other Care Team Provider:      Education and Discussions with Family / Patient: patient    Time Spent for Care: 45 minutes  More than 50% of total time spent on counseling and coordination of care as described above  Current Length of Stay: 1 day(s)    Current Patient Status: Inpatient   Certification Statement: The patient will continue to require additional inpatient hospital stay due to angioedema, monitor for respiratory distress    Discharge Plan: tomorrow    Code Status: Level 1 - Full Code      Subjective:   I am doing well  Why do I have swelling of my right eye and my throat  Objective:     Vitals:   Temp (24hrs), Av 5 °F (36 9 °C), Min:98 3 °F (36 8 °C), Max:98 6 °F (37 °C)    Temp:  [98 3 °F (36 8 °C)-98 6 °F (37 °C)] 98 6 °F (37 °C)  HR:  [] 99  Resp:  [18-35] 18  BP: (107-209)/() 139/96  SpO2:  [93 %-100 %] 96 %  Body mass index is 30 37 kg/m²  Input and Output Summary (last 24 hours): Intake/Output Summary (Last 24 hours) at 2019 1239  Last data filed at 2019 1236  Gross per 24 hour   Intake 1333 ml   Output --   Net 1333 ml       Physical Exam:     Physical Exam   Constitutional: She appears well-developed and well-nourished  HENT:   Head: Normocephalic  Nose: Nose normal    Mouth/Throat: Oropharynx is clear and moist  No oropharyngeal exudate  Eyes: Pupils are equal, round, and reactive to light  EOM are normal  Right eye exhibits no discharge  Left eye exhibits no discharge  No scleral icterus  Cardiovascular: Normal rate  Pulmonary/Chest: No stridor  No respiratory distress  She has wheezes  She has no rales  She exhibits no tenderness  Nursing note and vitals reviewed        Additional Data:     Labs:    Results from last 7 days   Lab Units 19  0520   WBC Thousand/uL 10 27*   HEMOGLOBIN g/dL 14 3   HEMATOCRIT % 45 2   PLATELETS Thousands/uL 295   NEUTROS PCT % 93*   LYMPHS PCT % 6*   MONOS PCT % 1*   EOS PCT % 0     Results from last 7 days   Lab Units 02/12/19  0520 02/11/19  2225  02/11/19  2221   POTASSIUM mmol/L 4 3 2 7*   < >  --    CHLORIDE mmol/L 106 103   < >  --    CO2 mmol/L 22 27   < >  --    CO2, I-STAT mmol/L  --   --   --  28   BUN mg/dL 14 13   < >  --    CREATININE mg/dL 1 17 1 15   < >  --    CALCIUM mg/dL 8 9 9 1   < >  --    ALK PHOS U/L  --  89  --   --    ALT U/L  --  31  --   --    AST U/L  --  15  --   --    GLUCOSE, ISTAT mg/dl  --   --   --  126    < > = values in this interval not displayed  * I Have Reviewed All Lab Data Listed Above  * Additional Pertinent Lab Tests Reviewed: Kristina 65 Admission Reviewed    Imaging:    Imaging Reports Reviewed Today Include:    Imaging Personally Reviewed by Myself Includes:      Recent Cultures (last 7 days):           Last 24 Hours Medication List:     Current Facility-Administered Medications:  aluminum-magnesium hydroxide-simethicone 15 mL Oral Q6H PRN Eugenia Kendall MD   amitriptyline 50 mg Oral HS Eugenia Kendall MD   amLODIPine 5 mg Oral Daily Eugenia Kendall MD   docusate sodium 100 mg Oral BID PRN Eugenia Kendall MD   enoxaparin 40 mg Subcutaneous Daily Eugenia Kendall MD   levalbuterol 1 25 mg Nebulization TID Eugenia Kendall MD   And       sodium chloride 3 mL Nebulization TID Eugenia Kendall MD   methylPREDNISolone sodium succinate 40 mg Intravenous Q8H [facility] Eugenia Kendall MD   ondansetron 4 mg Intravenous Q8H PRN Eugenia Kendall MD   potassium-sodium phosphateS 1 tablet Oral 4x Daily (with meals and at bedtime) Anthony James MD        Today, Patient Was Seen By: Anthony James MD    ** Please Note: Dictation voice to text software may have been used in the creation of this document   **

## 2019-02-12 NOTE — UTILIZATION REVIEW
Initial Clinical Review    Admission: Date/Time/Statement: 2/11/19 @ 2354   Orders Placed This Encounter   Procedures    Inpatient Admission     Pt is on bipap     Standing Status:   Standing     Number of Occurrences:   1     Order Specific Question:   Admitting Physician     Answer:   Charlie Warren [[id number]]     Order Specific Question:   Level of Care     Answer:   Level 2 Stepdown / HOT [14]     Order Specific Question:   Estimated length of stay     Answer:   More than 2 Midnights     Order Specific Question:   Certification     Answer:   I certify that inpatient services are medically necessary for this patient for a duration of greater than two midnights  See H&P and MD Progress Notes for additional information about the patient's course of treatment  ED: Date/Time/Mode of Arrival:   ED Arrival Information     Expected Arrival Acuity Means of Arrival Escorted By Service Admission Type    - 2/11/2019 22:11 [facility]gent [facility] Hospitalist Emergency    Arrival Complaint    -        Chief Complaint:   Chief Complaint   Patient presents with    Shortness of Breath     sob yesterday, couldnt swallow tonight with difficulty taking deep breath  R eye swelling  pt denies drug use       History of Illness:    39year old female  Presents to ed for evaluation of shortness of breath for 2 days associated with enlargement of the tongue and abdominal breathing on arrival    Placed on bipap      ED Vital Signs:   02/11/19 2235 02/11/19 2215 02/11/19 2215 02/11/19 2219 02/11/19 2215   98 3 °F (36 8 °C) (!) 140 (!) 30 (!) 209/114 94 %      No Pain       02/12/19 85 4 kg (188 lb 3 oz)     Vital Signs (abnormal):    02/12/19 0227  --  123Abnormal   --  116/72  --  --  Standing - Orthostatic VS   02/12/19 0225  --  119Abnormal   --  107/65  --  --  Sitting - Orthostatic VS   02/12/19 0224  --  108Abnormal   --  115/65  --  --  Lying - Orthostatic VS   02/12/19 0139  98 6 °F (37 °C)  114Abnormal   20 128/74  98 %  None (Room air)  Lying   02/12/19 0045  --  116Abnormal   19  118/60  93 %  None (Room air)  --   02/11/19 2249  --  129Abnormal   35Abnormal   153/72  99 %  --   --   O2 Device: bipap at 02/11/19 2249         Pertinent Labs/Diagnostic Test Results:    Potassium  2 7       ED Treatment:   Medication Administration from 02/11/2019 2211 to 02/12/2019 0138       Date/Time Order Dose Route     02/11/2019 2226 albuterol inhalation solution 10 mg 10 mg Nebulization     02/11/2019 2226 ipratropium (ATROVENT) 0 02 % inhalation solution 1 mg 1 mg Nebulization     02/11/2019 2226 sodium chloride 0 9 % inhalation solution 3 mL 3 mL Nebulization     02/11/2019 2231 famotidine (PEPCID) injection 20 mg 20 mg Intravenous     02/11/2019 2304 sodium chloride 0 9 % bolus 1,000 mL 1,000 mL Intravenous     02/12/2019 0122 potassium chloride (K-DUR,KLOR-CON) CR tablet 40 mEq 40 mEq Oral            Past Medical History:    Diabetes mellitus ([address] )    Hypertension     Admitting Diagnosis:     Anaphylaxis [T78  2XXA]  Dyspnea [R06 00]  SOB (shortness of breath) [R06 02]  Reactive airway disease with acute exacerbation, unspecified asthma severity, unspecified whether persistent [J45 901]      Age/Sex: 39 y o  female       Assessment/Plan:    Reactive airway disease with acute exacerbation  Assessment & Plan  Patient started on BiPAP with good results after nebulizations  However because of the history of swelling and reactive airway; might benefit from anti-inflammatory steroids  Will start methylprednisolone 40 mg every 8 hours  Patient does not have a past history of reactive airway or asthma or COPD or smoking; will get pulmonary consult for opinion  Nebulizations, respiratory support and protocol, methylprednisolone as discussed and oxygen supplementation          Admission Orders:    aluminum-magnesium hydroxide-simethicone 15 mL Oral Q6H PRN   amitriptyline 50 mg Oral HS   amLODIPine 5 mg Oral Daily   docusate sodium 100 mg Oral BID PRN   enoxaparin 40 mg Subcutaneous Daily   levalbuterol 1 25 mg Nebulization TID   And      sodium chloride 3 mL Nebulization TID   methylPREDNISolone sodium succinate 40 mg Intravenous Q8H [address]   ondansetron 4 mg Intravenous Q8H PRN   potassium-sodium phosphateS 1 tablet Oral 4x Daily (with meals and at bedtime)

## 2019-02-12 NOTE — PLAN OF CARE
Problem: PAIN - ADULT  Goal: Verbalizes/displays adequate comfort level or baseline comfort level  Description  Interventions:  - Encourage patient to monitor pain and request assistance  - Assess pain using appropriate pain scale  - Administer analgesics based on type and severity of pain and evaluate response  - Implement non-pharmacological measures as appropriate and evaluate response  - Consider cultural and social influences on pain and pain management  - Notify physician/advanced practitioner if interventions unsuccessful or patient reports new pain  Outcome: Progressing     Problem: DISCHARGE PLANNING  Goal: Discharge to home or other facility with appropriate resources  Description  INTERVENTIONS:  - Identify barriers to discharge w/patient and caregiver  - Arrange for needed discharge resources and transportation as appropriate  - Identify discharge learning needs (meds, wound care, etc )  - Arrange for interpretive services to assist at discharge as needed  - Refer to Case Management Department for coordinating discharge planning if the patient needs post-hospital services based on physician/advanced practitioner order or complex needs related to functional status, cognitive ability, or social support system  Outcome: Progressing     Problem: Knowledge Deficit  Goal: Patient/family/caregiver demonstrates understanding of disease process, treatment plan, medications, and discharge instructions  Description  Complete learning assessment and assess knowledge base    Interventions:  - Provide teaching at level of understanding  - Provide teaching via preferred learning methods  Outcome: Progressing     Problem: RESPIRATORY - ADULT  Goal: Achieves optimal ventilation and oxygenation  Description  INTERVENTIONS:  - Assess for changes in respiratory status  - Assess for changes in mentation and behavior  - Position to facilitate oxygenation and minimize respiratory effort  - Oxygen administration by appropriate delivery method based on oxygen saturation (per order) or ABGs  - Initiate smoking cessation education as indicated  - Encourage broncho-pulmonary hygiene including cough, deep breathe, Incentive Spirometry  - Assess the need for suctioning and aspirate as needed  - Assess and instruct to report SOB or any respiratory difficulty  - Respiratory Therapy support as indicated  Outcome: Progressing

## 2019-02-12 NOTE — ASSESSMENT & PLAN NOTE
Patient was on ACE inhibitor: Lisinopril  Possible reaction to ACE inhibitor  Continue monitoring overnight for any swelling and breathing issues  Pulmonary on board

## 2019-02-12 NOTE — ED NOTES
No call from floor RN  Attempted to call and no response from RN         Francheska Jones RN  02/12/19 0943

## 2019-02-12 NOTE — ED PROVIDER NOTES
History  Chief Complaint   Patient presents with    Shortness of Breath     sob yesterday, couldnt swallow tonight with difficulty taking deep breath  R eye swelling  pt denies drug use  HPI  39 old woman comes in with acute onset shortness of breath, concern for anaphylaxis  Patient states that she has had some shortness of breath this started yesterday  Been acutely today, she started have swelling of her face, trouble swallowing, trouble breathing  EMS was called, stating that when they were there, she was not moving a lot of air, she was stridor is, she had swelling to her face  They treated her for presumed anaphylactic reaction  She was started on albuterol, she got the 0 3mg of epinephrine IM, and got Solu-Medrol 125 mg  She had 2 g of magnesium  Patient also got 2nd round of 0 3 mg of epinephrine  Prior to Admission Medications   Prescriptions Last Dose Informant Patient Reported? Taking? amLODIPine (NORVASC) 5 mg tablet   Yes Yes   Sig: Take 1 tablet by mouth daily   amitriptyline (ELAVIL) 50 mg tablet   Yes Yes   Sig: Take 1 tablet by mouth daily at bedtime      Facility-Administered Medications: None       Past Medical History:   Diagnosis Date    Diabetes mellitus ([facility] [address] )     Hypertension        Past Surgical History:   Procedure Laterality Date    KIDNEY STONE SURGERY         Family History   Family history unknown: Yes     I have reviewed and agree with the history as documented  Social History     Tobacco Use    Smoking status: Never Smoker    Smokeless tobacco: Never Used   Substance Use Topics    Alcohol use: Not Currently     Frequency: Monthly or less     Binge frequency: Never    Drug use: Not Currently        Review of Systems   Constitutional: Negative  Negative for chills and fever  HENT: Negative for congestion and sore throat  Eyes: Negative  Negative for discharge and redness     Respiratory: Positive for chest tightness, shortness of breath, wheezing and stridor  Cardiovascular: Negative  Negative for chest pain and palpitations  Gastrointestinal: Negative  Negative for abdominal pain, nausea and vomiting  Endocrine: Negative  Negative for cold intolerance and polyphagia  Genitourinary: Negative  Negative for difficulty urinating and dysuria  Musculoskeletal: Negative  Negative for arthralgias and back pain  Skin: Negative  Negative for color change and wound  Allergic/Immunologic: Negative  Negative for environmental allergies  Neurological: Negative  Negative for dizziness, weakness and headaches  Hematological: Negative  Psychiatric/Behavioral: Negative  Negative for behavioral problems  The patient is not nervous/anxious  All other systems reviewed and are negative  Physical Exam  ED Triage Vitals   Temperature Pulse Respirations Blood Pressure SpO2   02/11/19 2235 02/11/19 2215 02/11/19 2215 02/11/19 2219 02/11/19 2215   98 3 °F (36 8 °C) (!) 140 (!) 30 (!) 209/114 94 %      Temp Source Heart Rate Source Patient Position - Orthostatic VS BP Location FiO2 (%)   02/11/19 2235 02/11/19 2215 02/12/19 0139 02/11/19 2249 --   Axillary Monitor Lying Right arm       Pain Score       02/11/19 2215       No Pain           Orthostatic Vital Signs  Vitals:    02/12/19 0224 02/12/19 0225 02/12/19 0227 02/12/19 0638   BP: 115/65 107/65 116/72 139/96   Pulse: (!) 108 (!) 119 (!) 123 99   Patient Position - Orthostatic VS: Lying - Orthostatic VS Sitting - Orthostatic VS Standing - Orthostatic VS Lying       Physical Exam   Constitutional: She is oriented to person, place, and time  She appears well-developed and well-nourished  No distress  HENT:   Head: Normocephalic and atraumatic  Right Ear: External ear normal    Left Ear: External ear normal    Mouth/Throat: Oropharynx is clear and moist    No lip or tongue swelling appreciated  Eyes: Pupils are equal, round, and reactive to light   Conjunctivae and EOM are normal  Right eye exhibits no discharge  Left eye exhibits no discharge  No scleral icterus  Neck: Normal range of motion  Neck supple  No tracheal deviation present  No thyromegaly present  Cardiovascular: Normal rate, regular rhythm and intact distal pulses  Exam reveals no gallop and no friction rub  No murmur heard  Pulmonary/Chest: Effort normal  No stridor  No respiratory distress  She has decreased breath sounds  She has wheezes  She has no rales  Abdominal: Soft  Bowel sounds are normal  She exhibits no distension  There is no tenderness  There is no rebound and no guarding  Musculoskeletal: Normal range of motion  She exhibits no edema or deformity  Neurological: She is alert and oriented to person, place, and time  No cranial nerve deficit  Skin: Skin is warm and dry  No rash noted  She is not diaphoretic  No erythema  Swelling to R eye, hives noted on abdomen  Psychiatric: She has a normal mood and affect  Her behavior is normal  Thought content normal    Nursing note and vitals reviewed        ED Medications  Medications   amitriptyline (ELAVIL) tablet 50 mg (has no administration in time range)   amLODIPine (NORVASC) tablet 5 mg (5 mg Oral Given 2/12/19 0839)   docusate sodium (COLACE) capsule 100 mg (has no administration in time range)   ondansetron (ZOFRAN) injection 4 mg (has no administration in time range)   aluminum-magnesium hydroxide-simethicone (MYLANTA) 200-200-20 mg/5 mL oral suspension 15 mL (has no administration in time range)   enoxaparin (LOVENOX) subcutaneous injection 40 mg (40 mg Subcutaneous Given 2/12/19 0839)   levalbuterol (XOPENEX) inhalation solution 1 25 mg (1 25 mg Nebulization Given 2/12/19 0740)     And   sodium chloride 0 9 % inhalation solution 3 mL (3 mL Nebulization Given 2/12/19 0740)   potassium-sodium phosphateS (K-PHOS,PHOSPHA 250) -250 mg tablet 1 tablet (1 tablet Oral Given 2/12/19 1249)   methylPREDNISolone sodium succinate (Solu-MEDROL) injection 40 mg (has no administration in time range)   predniSONE tablet 40 mg (has no administration in time range)   albuterol inhalation solution 10 mg (10 mg Nebulization Given 2/11/19 2226)     And   ipratropium (ATROVENT) 0 02 % inhalation solution 1 mg (1 mg Nebulization Given 2/11/19 2226)     And   sodium chloride 0 9 % inhalation solution 3 mL (3 mL Nebulization Given 2/11/19 2226)   famotidine (PEPCID) injection 20 mg (20 mg Intravenous Given 2/11/19 2231)    EMS REPLENISHMENT MED ( Does not apply Given to EMS 2/11/19 2256)    EMS REPLENISHMENT MED ( Does not apply Given to EMS 2/11/19 2258)   sodium chloride 0 9 % bolus 1,000 mL (0 mL Intravenous Stopped 2/12/19 0122)   potassium chloride (K-DUR,KLOR-CON) CR tablet 40 mEq (40 mEq Oral Given 2/12/19 0122)       Diagnostic Studies  Results Reviewed     Procedure Component Value Units Date/Time    Comprehensive metabolic panel [[id number]]  (Abnormal) Collected:  02/11/19 2225    Lab Status:  Final result Specimen:  Blood from Arm, Right Updated:  02/11/19 2332     Sodium 138 mmol/L      Potassium 2 7 mmol/L      Chloride 103 mmol/L      CO2 27 mmol/L      ANION GAP 8 mmol/L      BUN 13 mg/dL      Creatinine 1 15 mg/dL      Glucose 116 mg/dL      Calcium 9 1 mg/dL      AST 15 U/L      ALT 31 U/L      Alkaline Phosphatase 89 U/L      Total Protein 8 8 g/dL      Albumin 4 1 g/dL      Total Bilirubin 0 27 mg/dL      eGFR 58 ml/min/1 73sq m     Narrative:       National Kidney Disease Education Program recommendations are as follows:  GFR calculation is accurate only with a steady state creatinine  Chronic Kidney disease less than 60 ml/min/1 73 sq  meters  Kidney failure less than 15 ml/min/1 73 sq  meters      Blood gas, venous [[id number]]  (Abnormal) Collected:  02/11/19 2302    Lab Status:  Final result Specimen:  Blood from Arm, Right Updated:  02/11/19 2312     pH, Parth 7 287     pCO2, Parth 49 2 mm Hg      pO2, Parth 51 1 mm Hg      HCO3, Parth 23 0 mmol/L      Base Excess, [employer] -4 1 mmol/L      O2 Content, Parth 17 6 ml/dL      O2 HGB, VENOUS 80 1 %     Troponin I [[id number]]  (Normal) Collected:  02/11/19 2225    Lab Status:  Final result Specimen:  Blood from Arm, Right Updated:  02/11/19 2300     Troponin I <0 02 ng/mL     POCT pregnancy, urine [[id number]]  (Normal) Resulted:  02/11/19 2256    Lab Status:  Final result Updated:  02/11/19 2256     EXT PREG TEST UR (Ref: Negative) neg    POCT urinalysis dipstick [[id number]]  (Normal) Resulted:  02/11/19 2256    Lab Status:  Final result Specimen:  Urine Updated:  02/11/19 2256     Color, UA 0    ED Urine Macroscopic [[id number]] Collected:  02/11/19 2257    Lab Status:  Final result Specimen:  Urine Updated:  02/11/19 2255     Color, UA Yellow     Clarity, UA Clear     pH, UA 6 0     Leukocytes, UA Negative     Nitrite, UA Negative     Protein, UA Negative mg/dl      Glucose, UA Negative mg/dl      Ketones, UA Negative mg/dl      Urobilinogen, UA 0 2 E U /dl      Bilirubin, UA Negative     Blood, UA Negative     Specific Gravity, UA <=1 005    Narrative:       CLINITEK RESULT    Blood gas, venous [[id number]]  (Abnormal) Collected:  02/11/19 2225    Lab Status:  Final result Specimen:  Blood from Arm, Right Updated:  02/11/19 2238     pH, Parth 7 321     pCO2, Parth 52 4 mm Hg      pO2, Parth 48 3 mm Hg      HCO3, Parth 26 5 mmol/L      Base Excess, Parth -0 6 mmol/L      O2 Content, Parth 17 6 ml/dL      O2 HGB, VENOUS 79 1 %     CBC and differential [[id number]]  (Abnormal) Collected:  02/11/19 2225    Lab Status:  Final result Specimen:  Blood from Arm, Right Updated:  02/11/19 2236     WBC 8 98 Thousand/uL      RBC 5 40 Million/uL      Hemoglobin 15 2 g/dL      Hematocrit 47 4 %      MCV 88 fL      MCH 28 1 pg      MCHC 32 1 g/dL      RDW 13 8 %      MPV 9 0 fL      Platelets 519 Thousands/uL      nRBC 0 /100 WBCs      Neutrophils Relative 32 %      Immat GRANS % 0 %      Lymphocytes Relative 61 %      Monocytes Relative 7 %      Eosinophils Relative 0 %      Basophils Relative 0 %      Neutrophils Absolute 2 89 Thousands/µL      Immature Grans Absolute 0 02 Thousand/uL      Lymphocytes Absolute 5 42 Thousands/µL      Monocytes Absolute 0 62 Thousand/µL      Eosinophils Absolute 0 00 Thousand/µL      Basophils Absolute 0 03 Thousands/µL     POCT Blood Gas (CG8+) [[id number]]  (Abnormal) Collected:  02/11/19 2221    Lab Status:  Final result Specimen:  Venous Updated:  02/11/19 2229     ph, Parth ISTAT 7 381     pCO2, Parth i-STAT 45 5 mm HG      pO2, Parth i-STAT 41 0 mm HG      BE, i-STAT 1 mmol/L      HCO3, Parth i-STAT 27 0 mmol/L      CO2, i-STAT 28 mmol/L      O2 Sat, i-STAT 75 %      SODIUM, I-STAT 142 mmol/l      Potassium, i-STAT 2 7 mmol/L      Calcium, Ionized i-STAT 1 17 mmol/L      Hct, i-STAT 48 %      Hgb, i-STAT 16 3 g/dl      Glucose, i-STAT 126 mg/dl      Specimen Type VENOUS                 XR chest 1 view portable   ED Interpretation by Farrah Ramos MD (02/11 6950)   No acute pulmonary disease  Final Result by Chris Kramer MD (02/12 7860)   1  Bibasilar opacities may represent atelectasis and/or pneumonia in the appropriate clinical setting  Differential considerations would also include alveolar edema or aspiration  Workstation performed: [id number]               Procedures  Procedures      Phone Consults  ED Phone Contact    ED Course     Pt was on bipap, and feels improved  Pt admitted to medicine level 2 stepdown  Trialed off bipap  Off bipap prior to leaving the ED for the floor  MDM  Number of Diagnoses or Management Options  Anaphylaxis:   Dyspnea:   Diagnosis management comments: 38 yo woman who presented in resp distress  Initial concern for angioedema, but no swelling of lips or tongue  Likely anaphylaxis given presentation and hives on initial exam  Cardiac evaluation initiated started on bipap for comfort   Pt will be admitted to the hospital        Disposition  Final diagnoses:   Dyspnea Anaphylaxis     Time reflects when diagnosis was documented in both MDM as applicable and the Disposition within this note     Time User Action Codes Description Comment    2/11/2019 11:52 PM Kalpesh Asp Add [R06 00] Dyspnea     2/11/2019 11:53 PM Katya Tavo  2XXA] Anaphylaxis     2/11/2019 11:53 PM Kalpesh Asp Modify [R06 00] Dyspnea     2/11/2019 11:53 PM Troy Melo  2XXA] Anaphylaxis     2/12/2019 12:10 AM Claudene Keener Add [J45 901] Reactive airway disease with acute exacerbation, unspecified asthma severity, unspecified whether persistent       ED Disposition     ED Disposition Condition Date/Time Comment    Admit Stable Mon Feb 11, 2019 11:52 PM Case was discussed with MARCELINO and the patient's admission status was agreed to be Admission Status: inpatient status to the service of Dr Alo Donald   Follow-up Information    None         Current Discharge Medication List      CONTINUE these medications which have NOT CHANGED    Details   amitriptyline (ELAVIL) 50 mg tablet Take 1 tablet by mouth daily at bedtime      amLODIPine (NORVASC) 5 mg tablet Take 1 tablet by mouth daily           No discharge procedures on file  ED Provider  Attending physically available and evaluated Keke Pineda I managed the patient along with the ED Attending      Electronically Signed by         Vasyl Barragan MD  02/12/19 4780

## 2019-02-13 VITALS
TEMPERATURE: 97.6 F | BODY MASS INDEX: 30.61 KG/M2 | RESPIRATION RATE: 18 BRPM | HEIGHT: 66 IN | OXYGEN SATURATION: 93 % | HEART RATE: 102 BPM | SYSTOLIC BLOOD PRESSURE: 117 MMHG | WEIGHT: 190.48 LBS | DIASTOLIC BLOOD PRESSURE: 63 MMHG

## 2019-02-13 DIAGNOSIS — Z71.89 COMPLEX CARE COORDINATION: Primary | ICD-10-CM

## 2019-02-13 LAB
ANION GAP SERPL CALCULATED.3IONS-SCNC: 7 MMOL/L (ref 4–13)
BASOPHILS # BLD AUTO: 0.01 THOUSANDS/ΜL (ref 0–0.1)
BASOPHILS NFR BLD AUTO: 0 % (ref 0–1)
BUN SERPL-MCNC: 15 MG/DL (ref 5–25)
CALCIUM SERPL-MCNC: 8.8 MG/DL (ref 8.3–10.1)
CHLORIDE SERPL-SCNC: 106 MMOL/L (ref 100–108)
CO2 SERPL-SCNC: 24 MMOL/L (ref 21–32)
CREAT SERPL-MCNC: 0.88 MG/DL (ref 0.6–1.3)
EOSINOPHIL # BLD AUTO: 0 THOUSAND/ΜL (ref 0–0.61)
EOSINOPHIL NFR BLD AUTO: 0 % (ref 0–6)
ERYTHROCYTE [DISTWIDTH] IN BLOOD BY AUTOMATED COUNT: 14.3 % (ref 11.6–15.1)
GFR SERPL CREATININE-BSD FRML MDRD: 80 ML/MIN/1.73SQ M
GLUCOSE SERPL-MCNC: 161 MG/DL (ref 65–140)
HCT VFR BLD AUTO: 45.1 % (ref 34.8–46.1)
HGB BLD-MCNC: 13.9 G/DL (ref 11.5–15.4)
IMM GRANULOCYTES # BLD AUTO: 0.06 THOUSAND/UL (ref 0–0.2)
IMM GRANULOCYTES NFR BLD AUTO: 0 % (ref 0–2)
LYMPHOCYTES # BLD AUTO: 1.2 THOUSANDS/ΜL (ref 0.6–4.47)
LYMPHOCYTES NFR BLD AUTO: 9 % (ref 14–44)
MCH RBC QN AUTO: 28 PG (ref 26.8–34.3)
MCHC RBC AUTO-ENTMCNC: 30.8 G/DL (ref 31.4–37.4)
MCV RBC AUTO: 91 FL (ref 82–98)
MONOCYTES # BLD AUTO: 0.12 THOUSAND/ΜL (ref 0.17–1.22)
MONOCYTES NFR BLD AUTO: 1 % (ref 4–12)
NEUTROPHILS # BLD AUTO: 12.57 THOUSANDS/ΜL (ref 1.85–7.62)
NEUTS SEG NFR BLD AUTO: 90 % (ref 43–75)
NRBC BLD AUTO-RTO: 0 /100 WBCS
PLATELET # BLD AUTO: 289 THOUSANDS/UL (ref 149–390)
PMV BLD AUTO: 9.6 FL (ref 8.9–12.7)
POTASSIUM SERPL-SCNC: 4.9 MMOL/L (ref 3.5–5.3)
RBC # BLD AUTO: 4.97 MILLION/UL (ref 3.81–5.12)
SODIUM SERPL-SCNC: 137 MMOL/L (ref 136–145)
WBC # BLD AUTO: 13.96 THOUSAND/UL (ref 4.31–10.16)

## 2019-02-13 PROCEDURE — 99239 HOSP IP/OBS DSCHRG MGMT >30: CPT | Performed by: INTERNAL MEDICINE

## 2019-02-13 PROCEDURE — 99231 SBSQ HOSP IP/OBS SF/LOW 25: CPT | Performed by: INTERNAL MEDICINE

## 2019-02-13 PROCEDURE — 94762 N-INVAS EAR/PLS OXIMTRY CONT: CPT

## 2019-02-13 PROCEDURE — 94640 AIRWAY INHALATION TREATMENT: CPT

## 2019-02-13 PROCEDURE — 85025 COMPLETE CBC W/AUTO DIFF WBC: CPT | Performed by: INTERNAL MEDICINE

## 2019-02-13 PROCEDURE — 94760 N-INVAS EAR/PLS OXIMETRY 1: CPT

## 2019-02-13 PROCEDURE — 80048 BASIC METABOLIC PNL TOTAL CA: CPT | Performed by: INTERNAL MEDICINE

## 2019-02-13 RX ORDER — PREDNISONE 20 MG/1
40 TABLET ORAL DAILY
Qty: 3 TABLET | Refills: 0 | Status: SHIPPED | OUTPATIENT
Start: 2019-02-14 | End: 2019-02-13

## 2019-02-13 RX ORDER — AMITRIPTYLINE HYDROCHLORIDE 50 MG/1
50 TABLET, FILM COATED ORAL
Qty: 30 TABLET | Refills: 0 | Status: SHIPPED | OUTPATIENT
Start: 2019-02-13

## 2019-02-13 RX ORDER — AMLODIPINE BESYLATE 5 MG/1
5 TABLET ORAL DAILY
Qty: 30 TABLET | Refills: 0 | Status: SHIPPED | OUTPATIENT
Start: 2019-02-13

## 2019-02-13 RX ORDER — PREDNISONE 10 MG/1
TABLET ORAL
Qty: 30 TABLET | Refills: 0 | Status: SHIPPED | OUTPATIENT
Start: 2019-02-13

## 2019-02-13 RX ADMIN — DIBASIC SODIUM PHOSPHATE, MONOBASIC POTASSIUM PHOSPHATE AND MONOBASIC SODIUM PHOSPHATE 1 TABLET: 852; 155; 130 TABLET ORAL at 06:32

## 2019-02-13 RX ADMIN — PREDNISONE 40 MG: 20 TABLET ORAL at 08:16

## 2019-02-13 RX ADMIN — ISODIUM CHLORIDE 3 ML: 0.03 SOLUTION RESPIRATORY (INHALATION) at 07:11

## 2019-02-13 RX ADMIN — AMLODIPINE BESYLATE 5 MG: 5 TABLET ORAL at 08:16

## 2019-02-13 RX ADMIN — LEVALBUTEROL 1.25 MG: 1.25 SOLUTION, CONCENTRATE RESPIRATORY (INHALATION) at 07:12

## 2019-02-13 NOTE — PLAN OF CARE
Problem: PAIN - ADULT  Goal: Verbalizes/displays adequate comfort level or baseline comfort level  Description  Interventions:  - Encourage patient to monitor pain and request assistance  - Assess pain using appropriate pain scale  - Administer analgesics based on type and severity of pain and evaluate response  - Implement non-pharmacological measures as appropriate and evaluate response  - Consider cultural and social influences on pain and pain management  - Notify physician/advanced practitioner if interventions unsuccessful or patient reports new pain  Outcome: Progressing     Problem: DISCHARGE PLANNING  Goal: Discharge to home or other facility with appropriate resources  Description  INTERVENTIONS:  - Identify barriers to discharge w/patient and caregiver  - Arrange for needed discharge resources and transportation as appropriate  - Identify discharge learning needs (meds, wound care, etc )  - Arrange for interpretive services to assist at discharge as needed  - Refer to Case Management Department for coordinating discharge planning if the patient needs post-hospital services based on physician/advanced practitioner order or complex needs related to functional status, cognitive ability, or social support system  Outcome: Progressing     Problem: Knowledge Deficit  Goal: Patient/family/caregiver demonstrates understanding of disease process, treatment plan, medications, and discharge instructions  Description  Complete learning assessment and assess knowledge base    Interventions:  - Provide teaching at level of understanding  - Provide teaching via preferred learning methods  Outcome: Progressing     Problem: RESPIRATORY - ADULT  Goal: Achieves optimal ventilation and oxygenation  Description  INTERVENTIONS:  - Assess for changes in respiratory status  - Assess for changes in mentation and behavior  - Position to facilitate oxygenation and minimize respiratory effort  - Oxygen administration by appropriate delivery method based on oxygen saturation (per order) or ABGs  - Initiate smoking cessation education as indicated  - Encourage broncho-pulmonary hygiene including cough, deep breathe, Incentive Spirometry  - Assess the need for suctioning and aspirate as needed  - Assess and instruct to report SOB or any respiratory difficulty  - Respiratory Therapy support as indicated  Outcome: Progressing     Problem: DISCHARGE PLANNING - CARE MANAGEMENT  Goal: Discharge to post-acute care or home with appropriate resources  Description  INTERVENTIONS:  - Conduct assessment to determine patient/family and health care team treatment goals, and need for post-acute services based on payer coverage, community resources, and patient preferences, and barriers to discharge  - Address psychosocial, clinical, and financial barriers to discharge as identified in assessment in conjunction with the patient/family and health care team  - Arrange appropriate level of post-acute services according to patient's   needs and preference and payer coverage in collaboration with the physician and health care team  - Communicate with and update the patient/family, physician, and health care team regarding progress on the discharge plan  - Arrange appropriate transportation to post-acute venues  - Plan for discharge to home     Outcome: Progressing

## 2019-02-13 NOTE — PROGRESS NOTES
Progress Note - Pulmonary   Coretha Nicole 39 y o  female MRN: [id number]  Unit/Bed#: [facility] 708-01 Encounter: [id number]      Assessment / Plan :  1  Acute Bronchospasm vs  Possible Reactive Airways   · Patient improved with steroids and nebulizers,   · Continue prednisone at 40 mg PO QD started today 19  Titrate by 10 mg PO every 3 days   · Continue Xopenex TID     2  Viral URI   · Continue with supportive care     3  Suspected GUILLE   · Follow up in the pulmonary office as an outpatient  Appointment made and placed in discharge instructions  Subjective:   Ms Petey Mckee is laying in bed upon assessment today  She reports that the swelling is decreased in her face and her breathing has improved  She is eager to go home  She denies any current SOB, Wheezing, Coughing, Fevers, chills, CP, chest tightness, diaphoresis, or difficulty swallowing  Objective:   Vitals: Blood pressure 117/63, pulse 102, temperature 97 6 °F (36 4 °C), temperature source Oral, resp  rate 18, height 5' 6" (1 676 m), weight 86 4 kg (190 lb 7 6 oz), SpO2 93 % , RA, Body mass index is 30 74 kg/m²  Intake/Output Summary (Last 24 hours) at 2019 0932  Last data filed at 2019 0805  Gross per 24 hour   Intake 693 ml   Output --   Net 693 ml         Physical Exam  Gen: Awake, alert, oriented x 3, no acute distress  HEENT: Mucous membranes moist, no oral lesions, no thrush  NECK: No accessory muscle use, JVP not elevated  Cardiac: Regular, single S1, single S2, no murmurs, no rubs, no gallops  Lungs: BS  in the Bilateral Lower Lobes, otherwise clear to ausculation throughout all lung fields without any wheezes rales or rhonchi   Abdomen: normoactive bowel sounds, soft nontender, nondistended, no rebound or rigidity, no guarding  Extremities: no cyanosis, no clubbing, no edema    Labs: I have personally reviewed pertinent lab results  , ABG: No results found for: PHART, UDA8IKX, PO2ART, LOX0CVY, O4GYAEXW, BEART, SOURCE, BNP: No results found for: BNP, CBC:   Lab Results   Component Value Date    WBC 13 96 (H) 02/13/2019    HGB 13 9 02/13/2019    HCT 45 1 02/13/2019    MCV 91 02/13/2019     02/13/2019    MCH 28 0 02/13/2019    MCHC 30 8 (L) 02/13/2019    RDW 14 3 02/13/2019    MPV 9 6 02/13/2019    NRBC 0 02/13/2019   , CMP:   Lab Results   Component Value Date    SODIUM 137 02/13/2019    K 4 9 02/13/2019     02/13/2019    CO2 24 02/13/2019    BUN 15 02/13/2019    CREATININE 0 88 02/13/2019    CALCIUM 8 8 02/13/2019    EGFR 80 02/13/2019   , PT/INR: No results found for: PT, INR, Troponin: No results found for: TROPONINI     Imaging and other studies: I have personally reviewed pertinent reports  CXR 2/11/19:   FINDINGS:     Cardiomediastinal silhouette appears unremarkable      Bibasilar opacities  No pneumothorax or pleural effusion      Osseous structures appear within normal limits for patient age      IMPRESSION:  1   Bibasilar opacities may represent atelectasis and/or pneumonia in the appropriate clinical setting  Differential considerations would also include alveolar edema or aspiration      Dion Gar PA-C

## 2019-02-14 NOTE — DISCHARGE SUMMARY
Discharge Summary - Erika Prince 39 y o  female MRN: [id number]    [facility] Encounter: [id number]    Admission Date:   Admission Orders (From admission, onward)    Ordered        02/11/19 2354  Inpatient Admission  Once     Order ID Start Status   [id number] 02/11/19 2354 Completed                Admitting Diagnosis: Anaphylaxis [T78  2XXA]  Dyspnea [R06 00]  SOB (shortness of breath) [R06 02]  Reactive airway disease with acute exacerbation, unspecified asthma severity, unspecified whether persistent [J45 901]    HPI:  Erika Prince is a 39 y o  female who has past medical history as per patient of benign essential hypertension, candidiasis, and depression  Curiously, the patient also has other medications in the Roster however denies ever taking them including lisinopril/hydrochlorothiazide, metformin, cetirizine  She denies history of asthma nor smoking or COPD  She also claims not to have had any history of childhood reactive airway disease or visits to the emergency room for shortness of breath as a pediatric patient  However over the past 2 days she was complaining of upper respiratory nasal symptoms with postnasal drip  Tonight instantaneously she became short of breath with note of enlargement of tongue  She denies any new foods or any new substances  Or new medications negative  With extreme shortness of breath, she was then started on nebulizations as well as BiPAP with good resolution  She now continues to breathe through the BiPAP and able to speak in longer sentences  There is also note of abdominal breathing but otherwise she feels much better with the ability to speak in longer sentences  Thinking it may be anaphylaxis, she was then started on Pepcid injection  She was given albuterol/Atrovent nebulization        Procedures Performed: No orders of the defined types were placed in this encounter        Summary of Hospital Course: Ms Paul Sanchez was telling me she was on lisinopril, she has taken medication before coming to the hospital  She was also on HCTZ combination  She noted swelling of her right eye and lips and having difficulty breathing  This seems like it is angioedema  She was treated in ED for anaphylaxis  She received nebulization and requiring bipap as well due to enlarged tongue  Drug reaction can take place even if use of the drug was taken a while back and reaction can take place spontanously  Lisinopril is stopped for her, she should not be on ACE inhibitors or ARB with class effect  Pulmonology saw patient think this is bronchospasm secondary to viral infection and reactive airway disease  They have placed her on nebulization and steroids  She is being discharge with prednisone 40 mg, taper 10 mg every three days  She needs to follow up with pulmonary outpatient  She needs to follow up with primary care doctor and update what happened  Significant Findings, Care, Treatment and Services Provided: as above2    Complications: none    Discharge Diagnosis: angioedema    Resolved Problems  Date Reviewed: 2/12/2019    None          Condition at Discharge: stable         Discharge instructions/Information to patient and family:   See after visit summary for information provided to patient and family  Provisions for Follow-Up Care:  See after visit summary for information related to follow-up care and any pertinent home health orders  PCP: Adela Mccall DO    Disposition: Home    Planned Readmission: No    Discharge Statement   I spent 35 minutes discharging the patient  This time was spent on the day of discharge  I had direct contact with the patient on the day of discharge  Additional documentation is required if more than 30 minutes were spent on discharge  Discharge Medications:  See after visit summary for reconciled discharge medications provided to patient and family

## 2019-03-11 PROBLEM — G47.33 OSA (OBSTRUCTIVE SLEEP APNEA): Status: ACTIVE | Noted: 2019-03-11

## 2021-06-17 ENCOUNTER — OFFICE VISIT (OUTPATIENT)
Dept: DENTISTRY | Facility: CLINIC | Age: 46
End: 2021-06-17

## 2021-06-17 VITALS — SYSTOLIC BLOOD PRESSURE: 151 MMHG | HEART RATE: 105 BPM | TEMPERATURE: 96.9 F | DIASTOLIC BLOOD PRESSURE: 101 MMHG

## 2021-06-17 DIAGNOSIS — Z01.20 ENCOUNTER FOR DENTAL EXAMINATION: ICD-10-CM

## 2021-06-17 DIAGNOSIS — K02.9 DENTAL CARIES: Primary | ICD-10-CM

## 2021-06-17 DIAGNOSIS — K03.6 ACCRETIONS ON TEETH: ICD-10-CM

## 2021-06-17 DIAGNOSIS — K03.6 DENTAL CALCULUS: ICD-10-CM

## 2021-06-17 PROCEDURE — D0191 ASSESSMENT OF A PATIENT: HCPCS | Performed by: DENTIST

## 2021-06-17 NOTE — PROGRESS NOTES
Pt presents to clinic for obturation #14  BP was too high for tx today  Pt denies having major anxieties in the chair  Pt states taking BP medication the night before and in the morning before appointment  Instructed pt to obtain appointment with family medicine physician to evaluate and adjust BP medication  Medical clearance form given to patient to complete before scheduling for her next appointment  CC: none today  Pt states that tooth has no symptoms since last tx  Presented implant-supported lower denture as an option  Pt is interested in obtaining consult in the future  NV: obturation #14, core build up if time allows  NNV: periodic exam, prophy  NNNV: #14 crown if tooth remains unsymptomatic     Tristan Kelley  In house faculty: Dr Eliz Umanzor

## 2021-10-12 ENCOUNTER — OFFICE VISIT (OUTPATIENT)
Dept: DENTISTRY | Facility: CLINIC | Age: 46
End: 2021-10-12

## 2021-10-12 VITALS — SYSTOLIC BLOOD PRESSURE: 132 MMHG | HEART RATE: 87 BPM | TEMPERATURE: 96.4 F | DIASTOLIC BLOOD PRESSURE: 84 MMHG

## 2021-10-12 DIAGNOSIS — K02.9 DENTAL CARIES: Primary | ICD-10-CM

## 2021-11-11 ENCOUNTER — OFFICE VISIT (OUTPATIENT)
Dept: DENTISTRY | Facility: CLINIC | Age: 46
End: 2021-11-11

## 2021-11-11 VITALS — DIASTOLIC BLOOD PRESSURE: 83 MMHG | TEMPERATURE: 96.9 F | SYSTOLIC BLOOD PRESSURE: 119 MMHG

## 2021-11-11 DIAGNOSIS — K02.9 DENTAL CARIES: ICD-10-CM

## 2021-11-11 DIAGNOSIS — K03.6 ACCRETIONS ON TEETH: ICD-10-CM

## 2021-11-11 DIAGNOSIS — Z01.20 ENCOUNTER FOR DENTAL EXAMINATION: Primary | ICD-10-CM

## 2021-11-11 DIAGNOSIS — K03.6 DENTAL CALCULUS: ICD-10-CM

## 2021-11-11 PROCEDURE — D1110 PROPHYLAXIS - ADULT: HCPCS

## 2021-11-11 PROCEDURE — D0120 PERIODIC ORAL EVALUATION - ESTABLISHED PATIENT: HCPCS | Performed by: DENTIST

## 2021-11-11 PROCEDURE — D0330 PANORAMIC RADIOGRAPHIC IMAGE: HCPCS

## 2022-02-02 ENCOUNTER — OFFICE VISIT (OUTPATIENT)
Dept: DENTISTRY | Facility: CLINIC | Age: 47
End: 2022-02-02

## 2022-02-02 VITALS — TEMPERATURE: 96.2 F | HEART RATE: 73 BPM | SYSTOLIC BLOOD PRESSURE: 126 MMHG | DIASTOLIC BLOOD PRESSURE: 86 MMHG

## 2022-02-02 DIAGNOSIS — Z01.20 ENCOUNTER FOR DENTAL EXAMINATION: Primary | ICD-10-CM

## 2022-02-02 PROCEDURE — WIS3001 RC INSTRUMENTATION: Performed by: DENTIST

## 2022-02-02 NOTE — PROGRESS NOTES
Robert Lanier presents for continuation of RCT #14  PMH reviewed, no changes  Applied topical benzocaine, administered 1 carp 2% lido 1:100k epi via infiltration and 1 carp 4% articaine 1:100k epi via infiltration  Clamp and rubber dam placed  Removed IRM and removed cotton mikael in tooth  Instrumented the MB, DB, P canals with the ProTaper gold system with S1,S2,F1,F2 files with NaOCl irrigation to the working lengths below:    MB: 19mm  DB: 19 5mm  P: 20mm    Placed F2 cones in the canals and took PA to verify WL fit  Recommend instrumenting MB canal 1mm more to 20mm from buccal cusps as the GP was slightly short of the apex  The P canal reached a hard stop at 20mm and decided not to instrument further due to possible calcification  Dried canals with paper points  Placed CaOH2, cotton pellet, and restored with Glass Ionomer  Note: the WL's may be different from above as there is a new Glass Ionomer core on the tooth  Next Provider: please re-verify WL's      NV: obturate #14 and core-buildup

## 2022-05-18 ENCOUNTER — OFFICE VISIT (OUTPATIENT)
Dept: DENTISTRY | Facility: CLINIC | Age: 47
End: 2022-05-18

## 2022-05-18 VITALS — TEMPERATURE: 97.8 F | HEART RATE: 69 BPM | DIASTOLIC BLOOD PRESSURE: 99 MMHG | SYSTOLIC BLOOD PRESSURE: 144 MMHG

## 2022-05-18 DIAGNOSIS — Z01.20 ENCOUNTER FOR DENTAL EXAMINATION: Primary | ICD-10-CM

## 2022-05-18 DIAGNOSIS — K03.6 DENTAL CALCULUS: ICD-10-CM

## 2022-05-18 DIAGNOSIS — K03.6 ACCRETIONS ON TEETH: ICD-10-CM

## 2022-05-18 PROCEDURE — D0120 PERIODIC ORAL EVALUATION - ESTABLISHED PATIENT: HCPCS | Performed by: DENTIST

## 2022-05-18 PROCEDURE — D1110 PROPHYLAXIS - ADULT: HCPCS

## 2022-05-18 PROCEDURE — D0220 INTRAORAL - PERIAPICAL FIRST RADIOGRAPHIC IMAGE: HCPCS

## 2022-05-18 PROCEDURE — D0230 INTRAORAL - PERIAPICAL EACH ADDITIONAL RADIOGRAPHIC IMAGE: HCPCS

## 2022-05-18 RX ORDER — LOSARTAN POTASSIUM AND HYDROCHLOROTHIAZIDE 25; 100 MG/1; MG/1
1 TABLET ORAL DAILY
COMMUNITY
Start: 2022-03-14 | End: 2023-03-14

## 2022-05-18 RX ORDER — GABAPENTIN 100 MG/1
100 CAPSULE ORAL 3 TIMES DAILY PRN
COMMUNITY

## 2022-05-18 RX ORDER — FAMOTIDINE 40 MG/1
TABLET, FILM COATED ORAL
COMMUNITY
Start: 2022-01-11

## 2022-05-18 RX ORDER — IBUPROFEN 800 MG/1
800 TABLET ORAL 2 TIMES DAILY PRN
COMMUNITY
Start: 2022-01-14

## 2022-05-18 RX ORDER — FLUTICASONE PROPIONATE 50 MCG
2 SPRAY, SUSPENSION (ML) NASAL DAILY
COMMUNITY
Start: 2021-11-18 | End: 2022-11-18

## 2022-05-18 NOTE — PROGRESS NOTES
RECALL EXAM, ADULT PROPHY,  PAX TAKEN OF MAXILLARY TEETH    REVIEWED MED HX: meds, allergies, health changes reviewed in EPIC  CHIEF CONCERN:  # 14 HAS BROKEN MORE OF EXISTING TOOTH  UNDERGOING ENDO TX  PAIN SCALE:  NOT REPORTED  ASA CLASS:  2  PLAQUE:  mild moderate  accumulation   CALCULUS:  , light calculus   BLEEDING light- moderate ANTERIOR CRS    STAIN :   light   ORAL HYGIENE:  good,   PERIO: RE- EVAL NEXT PRO   Was performed 11/21    Hand scaled, polished and flossed  Oral Hygiene Instruction:  recommended brushing 2 x daily for 2 minutes MIN, recommended flossing daily, reviewed dietary precautions  Visual and Tactile Intraoral/ Extraoral evaluation: Oral and Oropharyngeal cancer evaluation  No findings     Dr Geetha Arriola  Reviewed with patient clinical and radiographic findings and patient verbalized understanding  All questions and concerns addressed     Dr feels since she has a CLD, she will only need a build up # 15 for now   Suggested CR # 15 in future    REFERRALS: no referrals needed* ortho referral provided    CARIES FINDINGS:  caries noted # 6 FA   Old amal  # 15 breaking down    Next Visit:  Endo # 14 and build up   eval if crown is needed  ( scheduled for tomorrow)    Next Recall: 6 month recall     Last BWX:   Last Panorex/ FMX :   5/18/22

## 2022-05-19 ENCOUNTER — OFFICE VISIT (OUTPATIENT)
Dept: DENTISTRY | Facility: CLINIC | Age: 47
End: 2022-05-19

## 2022-05-19 VITALS — SYSTOLIC BLOOD PRESSURE: 144 MMHG | TEMPERATURE: 98 F | HEART RATE: 72 BPM | DIASTOLIC BLOOD PRESSURE: 92 MMHG

## 2022-05-19 DIAGNOSIS — K02.9 CARIES: Primary | ICD-10-CM

## 2022-05-19 PROCEDURE — D2330 RESIN-BASED COMPOSITE - 1 SURFACE, ANTERIOR: HCPCS | Performed by: DENTIST

## 2022-05-19 NOTE — PROGRESS NOTES
Composite Filling    Geralyn Gowers originally presented for obturation #14, but due lost time in discussion about finances with , no time to complete endo today  #6 composite filling completed instead  PMH reviewed, no changes  Applied topical benzocaine, administered 1 carp 4% articaine 1:100k epi via IANB  Prepped tooth #6 with 245 carbide on high speed  Caries removed with round carbide on slow speed  Isolation with cotton rolls and dri-angles    Etch with 37% H2PO4, rinse, dry  Applied Adhese with 20 second scrub once, gentle air dry and light cured for 10s  Restored with Tetric flowable shade A2 and A3, and light cured  Refined with finishing burs, polished with enhance point  Pt complained of fractured temporary restoration #14  Restored with [facility], verified contact and occlusion  Pt left satisfied  Nv: #14 obturation   Per Dr Chaka Maravilla last note, please reverify WL's and instrument further in MB canal  Please review note  Ww: Dr Baron Small

## 2022-06-22 ENCOUNTER — OFFICE VISIT (OUTPATIENT)
Dept: DENTISTRY | Facility: CLINIC | Age: 47
End: 2022-06-22

## 2022-06-22 VITALS — TEMPERATURE: 98.3 F | DIASTOLIC BLOOD PRESSURE: 95 MMHG | SYSTOLIC BLOOD PRESSURE: 143 MMHG | HEART RATE: 86 BPM

## 2022-06-22 DIAGNOSIS — Z01.20 ENCOUNTER FOR DENTAL EXAMINATION: Primary | ICD-10-CM

## 2022-06-22 PROCEDURE — D3330 ENDODONTIC THERAPY, MOLAR TOOTH (EXCLUDING FINAL RESTORATION): HCPCS | Performed by: DENTIST

## 2022-06-22 NOTE — PROGRESS NOTES
RCT - Stage 2    Bruce Jensen presents for obturation #14  PMH reviewed, no changes  Tooth is asymptomatic, palpation (-) percussion (-)  Applied topical benzocaine, administered 1 carp 4% articaine 1:100k epi via infiltration  Clamp and rubber dam placed  Removed temporary filling and cotton pellets  CaOH removed and re-establish working length to MAF hand file  Working length confirmed with MAF and apex   MB: 20mm  DB: 19 5mm  P: 20mm    Rotary filed to sizes specified above with the ProTaper system with NaOCL irrigation up to the F2 file  Dried canal with paper points, placed sealer with paper point  Obturated canals with ProTaper single cones and removed excess carrier with system B  Restored with Glass Ionomer and occlusion verified  Obtained PA radiograph  Obturation is dense with no porosities and filled to apex  Pt left satisfied and ambulatory       NV: core-buildup / crown prep #14

## 2022-08-03 ENCOUNTER — OFFICE VISIT (OUTPATIENT)
Dept: DENTISTRY | Facility: CLINIC | Age: 47
End: 2022-08-03

## 2022-08-03 VITALS — DIASTOLIC BLOOD PRESSURE: 125 MMHG | TEMPERATURE: 99.1 F | SYSTOLIC BLOOD PRESSURE: 176 MMHG

## 2022-08-03 DIAGNOSIS — K02.9 DENTAL CARIES: Primary | ICD-10-CM

## 2022-08-03 NOTE — PROGRESS NOTES
CC- none;   ASA II  Pain Scale 0  Pt here for #14 Core Buildup; elevated BP taken twice  _ pt was rushing;did not sleep well; high stress level and did not take any of her medication today  PLAN- pt understands and will come back and make a point to take her prescribed medication prior to appt

## 2022-08-05 ENCOUNTER — OFFICE VISIT (OUTPATIENT)
Dept: DENTISTRY | Facility: CLINIC | Age: 47
End: 2022-08-05

## 2022-08-05 VITALS — TEMPERATURE: 94.8 F | DIASTOLIC BLOOD PRESSURE: 100 MMHG | HEART RATE: 89 BPM | SYSTOLIC BLOOD PRESSURE: 155 MMHG

## 2022-08-05 DIAGNOSIS — Z01.20 ENCOUNTER FOR DENTAL EXAMINATION: Primary | ICD-10-CM

## 2022-08-05 PROBLEM — K05.30 PERIODONTITIS: Status: ACTIVE | Noted: 2022-08-05

## 2022-08-05 NOTE — PROGRESS NOTES
Core Build-up #14  Joce Naidu  initially presented for core build up and core build up on #14  After examination of radiographs and intraoral exam determined that tooth #14 may require crown lengthening due to subgingival mesial margin as such crown prep will not be completed at this visit  No pain, ASA II Pt understands  PMH reviewed, no changes  #14 presents with previous RCT treatment  RCT has good fill to apex with minimal access      Applied topical benzocaine, administered 1 carps 3% carbacain via infiltration  Temporary restorative material removed      Etch with 37% H2PO4, rinse, dry  Applied Multicore adhesive with 20 second scrub once, gentle air dry and light cured for 10s  Applied multicore to canals  Post-op radiograph taken and confirmed optimal fill  Pt left satisfied       NV: Eval with Dr macias for crown lengthenin on #14, Cron prep #14  NV 2: crown prep #15 and final impressions

## 2022-12-30 ENCOUNTER — OFFICE VISIT (OUTPATIENT)
Dept: DENTISTRY | Facility: CLINIC | Age: 47
End: 2022-12-30

## 2022-12-30 VITALS — DIASTOLIC BLOOD PRESSURE: 121 MMHG | SYSTOLIC BLOOD PRESSURE: 175 MMHG | HEART RATE: 80 BPM | TEMPERATURE: 97.8 F

## 2022-12-30 DIAGNOSIS — K04.7 TOOTH ABSCESS: Primary | ICD-10-CM

## 2022-12-30 RX ORDER — AMOXICILLIN 500 MG/1
500 CAPSULE ORAL EVERY 8 HOURS SCHEDULED
Qty: 21 CAPSULE | Refills: 0 | Status: SHIPPED | OUTPATIENT
Start: 2022-12-30 | End: 2023-01-06

## 2022-12-30 NOTE — PROGRESS NOTES
Limited Focus Exam    Nancy Romero is a 45yo female that presented to clinic for limited exam  Pt says "my tooth is falling" and I feel "pressure on my tooth"  Pt explained she had a tooth RCT recently and had pain in that area but feels like maybe there is an infection that has migrated to her front tooth and pointed to #9 which upon observation had a crown and was extruded so that the tooth was positioned more incisally than #8  Pt reported her tooth had been like this for 3 weeks  PMHR, no changes  Significant findings include:  · HTN   · Anxiety  · GIULLE    ASA II  Pain = pt only reported feeling of "pressure"  BP = 175/21mmHg  Translation = none needed    Clinical Eval #9:  Mobility = Class 2+  PD = 7mm (deepest)  Percussion = pt reports feeling of "pressure"   Palpation tenderness  Reflection of upper lip revealed gingival swelling with purulent exudate    Radiographic: (2) PA: #9 + #14  radiolucency on mesio-lateral of tooth #9   No evidence of infection assoc with #14 (recently RCT 6/22/22)    Dg: Periodontal abscess associated with tooth #9    TxP: Likely ext #9 and flipper placement    RX: Amox 500mg ([t denies PCN allergy)    NV: 1 week f/u #9 abscess + eval for EXT + impression for interim partial (flipper)

## 2023-03-16 ENCOUNTER — OFFICE VISIT (OUTPATIENT)
Dept: DENTISTRY | Facility: CLINIC | Age: 48
End: 2023-03-16

## 2023-03-16 VITALS — TEMPERATURE: 98.9 F

## 2023-03-16 DIAGNOSIS — Z01.20 ENCOUNTER FOR DENTAL EXAMINATION: Primary | ICD-10-CM

## 2023-03-16 DIAGNOSIS — K04.7 PERIAPICAL ABSCESS WITHOUT SINUS: ICD-10-CM

## 2023-03-16 RX ORDER — AMOXICILLIN 500 MG/1
500 CAPSULE ORAL EVERY 8 HOURS SCHEDULED
Qty: 21 CAPSULE | Refills: 0 | Status: SHIPPED | OUTPATIENT
Start: 2023-03-16 | End: 2023-03-23

## 2023-03-16 NOTE — DENTAL PROCEDURE DETAILS
Subjective   Patient ID: Jefry Benítez is a 49 y o  female  No chief complaint on file  Reviewed medical history   ASA  2  /125    164/110  she has not been taking BP meds regularly   stressed getting better control of BP   patient agreed      EXAM  DR Mono Julien    Initially scheduled for recall visit but due to BP could not proceed  she is having pain # 9 and wanted it addressed    Patient was in 12/2022 and placed on antibiotics for PAP # 9  was told she would need ext and we would make a flipper for her until further treatment was decided   she stated she was not contacted    Antibiotics prescribed again today for infection- exudate present # 9    PAX taken # 9 and # 14  ( previously endo treated tooth)  She has SFS in place and wants to proceed    NV 1  impressions for interim RPD to replace # 9  NV 2  extr # 9 and deliver RPD  NV 3 recall 50 min   max   Teeth only    Needs to continue treatment for CRS # 14, 15

## 2023-03-30 ENCOUNTER — OFFICE VISIT (OUTPATIENT)
Dept: DENTISTRY | Facility: CLINIC | Age: 48
End: 2023-03-30

## 2023-03-30 DIAGNOSIS — Z01.20 ENCOUNTER FOR DENTAL EXAMINATION: ICD-10-CM

## 2023-03-30 DIAGNOSIS — Z46.3 ENCOUNTER FOR FITTING OF DENTURES: Primary | ICD-10-CM

## 2023-03-30 NOTE — PROGRESS NOTES
Removable    Pt presents for a denture visit and gave verbal consent for treatment   Tooth #9 is to be extracted and resin denture to be delivered the same day only to  Replace 9    Explained denture making process to patient, shade matched C1, (B1 was also close) took pictures of both to send to lab     Upper and lower alginate impressions made using stock trays and blue bite bite reg taken    NV: delivery of denture and ext of #9

## 2023-05-31 ENCOUNTER — OFFICE VISIT (OUTPATIENT)
Dept: DENTISTRY | Facility: CLINIC | Age: 48
End: 2023-05-31

## 2023-05-31 VITALS — HEART RATE: 91 BPM | DIASTOLIC BLOOD PRESSURE: 111 MMHG | SYSTOLIC BLOOD PRESSURE: 190 MMHG

## 2023-05-31 DIAGNOSIS — Z01.20 ENCOUNTER FOR DENTAL EXAMINATION: Primary | ICD-10-CM

## 2023-06-01 NOTE — PROGRESS NOTES
PT arrived for ext of tooth #9 and delivery of resin partial    ASA II  No pain     due to BP could not proceed    she has not been taking BP meds regularly   stressed getting better control of BP   patient agreed    BP was taken 4 times and was elevated each time       NV: ext 9 and deliver denture

## 2023-06-05 ENCOUNTER — OFFICE VISIT (OUTPATIENT)
Dept: DENTISTRY | Facility: CLINIC | Age: 48
End: 2023-06-05

## 2023-06-05 VITALS — HEART RATE: 99 BPM | DIASTOLIC BLOOD PRESSURE: 94 MMHG | SYSTOLIC BLOOD PRESSURE: 154 MMHG

## 2023-06-05 DIAGNOSIS — K04.7 DENTAL ABSCESS: Primary | ICD-10-CM

## 2023-06-05 NOTE — DENTAL PROCEDURE DETAILS
Due for recall exam and cleaning  RM, NSC, ASA 2 - Patient with mild systemic disease with no functional limitations  Patient reports pain level of 3  Patient presents for extraction of 9 and delivery of upper resin partial denture  Potential complications reviewed with patient including post-op pain, swelling, infection, inability to open fully, damage to adjacent teeth, and prolonged numbness  Consent signed by patient and provider  Anesthesia: 0 75 carpules Articaine, 4% with Epinephrine 1:100,000, given via buccal Infiltration and palatal infiltration  Tx: Gingiva , Elevated, Delivered with Forceps, and Socket Curetted  Sutures: No Sutures Placed and 1 Interrupted Suture Placed with 3-0 Chromic Gut adequate hemostasis achieved    Delivered resin based partial denture  Fits well with no adjustment needed  Patient satisfied with esthetics (provider feels tooth shade is too light)        Post-Op: Patient given Post-Op Instruction Written and Verbally and Gauze    NV: due for Recall

## 2023-06-08 ENCOUNTER — OFFICE VISIT (OUTPATIENT)
Dept: DENTISTRY | Facility: CLINIC | Age: 48
End: 2023-06-08

## 2023-06-08 VITALS — HEART RATE: 69 BPM | DIASTOLIC BLOOD PRESSURE: 89 MMHG | SYSTOLIC BLOOD PRESSURE: 146 MMHG | TEMPERATURE: 98 F

## 2023-06-08 DIAGNOSIS — Z01.20 ENCOUNTER FOR DENTAL EXAMINATION: Primary | ICD-10-CM

## 2023-06-08 PROCEDURE — D0191 ASSESSMENT OF A PATIENT: HCPCS | Performed by: DENTIST

## 2023-06-08 NOTE — DENTAL PROCEDURE DETAILS
"Patient presents with CC of \"the tooth on the flipper I got yesterday doesn't match my other teeth\"  Formerly [facility], [facility], ASA II   EOE WNL  IOE shows flipper tooth is lighter and whiter than existing crowns (all of patient's anterior teeth have PFM crowns currently)  Discussed options, will need to send it back to the lab for any alterations  Patient is leaving for vacation Next Friday (8 days from now) and needs to have it back before then  Called [facility], confirmed that needed work would be completed and case would be returned to us by next Wednesday  Opened blinds to let natural light in  Selected shade A2 with patient  Waited several minutes to give eyes time to rest, rechecked shade and confirmed that A2 was closest match to existing anterior crowns  Discussed with patient that a perfect match was unlikely, and that there would always be differences in translucency between the plastic denture tooth and her existing PFM crowns  Patient understands        Packaged existing denture, models, and bite registration to be sent to lab to replace #9 denture tooth with shade A2    NV: Return flipper no later than next Thursday AM   "

## 2023-06-15 ENCOUNTER — OFFICE VISIT (OUTPATIENT)
Dept: DENTISTRY | Facility: CLINIC | Age: 48
End: 2023-06-15

## 2023-06-15 VITALS — DIASTOLIC BLOOD PRESSURE: 85 MMHG | SYSTOLIC BLOOD PRESSURE: 134 MMHG | HEART RATE: 82 BPM

## 2023-06-15 DIAGNOSIS — Z01.21 ENCOUNTER FOR DENTAL EXAMINATION AND CLEANING WITH ABNORMAL FINDINGS: Primary | ICD-10-CM

## 2023-06-16 NOTE — PROGRESS NOTES
Neptali Solis, 51 y o came for insertion of upper Resin based partial  replacing # 9  Med hx rvd: ASA II (pt takes meds for high BP (Previous appts, BP was v high), anxiety  Pain level: none    Tr: Inserted upper partial after minor adjustments  Pt removed it and tried it by herself , till she felt comfortable  Pt  Was happy with the shade and the fit  POI given   Pt asked to return for any adjustments if needed  Pt left the off comfortable  nv: Denture adj   And /or continue with restor tr , crown #14

## 2024-12-31 ENCOUNTER — OFFICE VISIT (OUTPATIENT)
Dept: OBGYN CLINIC | Facility: CLINIC | Age: 49
End: 2024-12-31

## 2024-12-31 VITALS
HEART RATE: 102 BPM | DIASTOLIC BLOOD PRESSURE: 110 MMHG | BODY MASS INDEX: 41.69 KG/M2 | HEIGHT: 61 IN | WEIGHT: 220.8 LBS | SYSTOLIC BLOOD PRESSURE: 168 MMHG

## 2024-12-31 DIAGNOSIS — R30.0 DYSURIA: Primary | ICD-10-CM

## 2024-12-31 DIAGNOSIS — I15.9 SECONDARY HYPERTENSION: ICD-10-CM

## 2024-12-31 DIAGNOSIS — R31.1 BENIGN ESSENTIAL MICROSCOPIC HEMATURIA: ICD-10-CM

## 2024-12-31 LAB
SL AMB  POCT GLUCOSE, UA: NEGATIVE
SL AMB LEUKOCYTE ESTERASE,UA: ABNORMAL
SL AMB POCT BILIRUBIN,UA: NEGATIVE
SL AMB POCT BLOOD,UA: ABNORMAL
SL AMB POCT CLARITY,UA: ABNORMAL
SL AMB POCT COLOR,UA: YELLOW
SL AMB POCT KETONES,UA: NEGATIVE
SL AMB POCT NITRITE,UA: ABNORMAL
SL AMB POCT PH,UA: 6
SL AMB POCT SPECIFIC GRAVITY,UA: 1.01
SL AMB POCT URINE PROTEIN: ABNORMAL
SL AMB POCT UROBILINOGEN: 0.2

## 2024-12-31 PROCEDURE — 81003 URINALYSIS AUTO W/O SCOPE: CPT | Performed by: OBSTETRICS & GYNECOLOGY

## 2024-12-31 PROCEDURE — 87077 CULTURE AEROBIC IDENTIFY: CPT | Performed by: OBSTETRICS & GYNECOLOGY

## 2024-12-31 PROCEDURE — 87186 SC STD MICRODIL/AGAR DIL: CPT | Performed by: OBSTETRICS & GYNECOLOGY

## 2024-12-31 PROCEDURE — 99203 OFFICE O/P NEW LOW 30 MIN: CPT | Performed by: OBSTETRICS & GYNECOLOGY

## 2024-12-31 PROCEDURE — 87086 URINE CULTURE/COLONY COUNT: CPT | Performed by: OBSTETRICS & GYNECOLOGY

## 2024-12-31 RX ORDER — PHENAZOPYRIDINE HYDROCHLORIDE 100 MG/1
100 TABLET, FILM COATED ORAL 3 TIMES DAILY PRN
Qty: 6 TABLET | Refills: 0 | Status: SHIPPED | OUTPATIENT
Start: 2024-12-31 | End: 2025-01-02

## 2024-12-31 RX ORDER — IBUPROFEN 800 MG/1
800 TABLET, FILM COATED ORAL 2 TIMES DAILY PRN
Qty: 90 TABLET | Refills: 2 | Status: SHIPPED | OUTPATIENT
Start: 2024-12-31

## 2024-12-31 RX ORDER — NITROFURANTOIN 25; 75 MG/1; MG/1
100 CAPSULE ORAL 2 TIMES DAILY
Qty: 14 CAPSULE | Refills: 0 | Status: SHIPPED | OUTPATIENT
Start: 2024-12-31 | End: 2024-12-31 | Stop reason: SDUPTHER

## 2024-12-31 RX ORDER — NITROFURANTOIN 25; 75 MG/1; MG/1
100 CAPSULE ORAL 2 TIMES DAILY
Qty: 14 CAPSULE | Refills: 0 | Status: SHIPPED | OUTPATIENT
Start: 2024-12-31 | End: 2025-01-07

## 2024-12-31 RX ORDER — PHENAZOPYRIDINE HYDROCHLORIDE 100 MG/1
100 TABLET, FILM COATED ORAL 3 TIMES DAILY PRN
Qty: 6 TABLET | Refills: 0 | Status: SHIPPED | OUTPATIENT
Start: 2024-12-31 | End: 2024-12-31 | Stop reason: SDUPTHER

## 2024-12-31 NOTE — PROGRESS NOTES
"PROBLEM GYNECOLOGICAL VISIT    Sirena King is a 49 y.o. female who presents today with complaint of Dysuria.  Her general medical history has been reviewed and she reports it as follows:    Past Medical History:   Diagnosis Date    Diabetes mellitus (HCC)     Hypertension      Past Surgical History:   Procedure Laterality Date    FL CYSTOGRAM  6/16/2022    FL CYSTOGRAM  6/9/2022    FL CYSTOGRAM  10/24/2018    FL CYSTOGRAM  10/10/2018    KIDNEY STONE SURGERY       OB History    No obstetric history on file.       Social History     Tobacco Use    Smoking status: Never    Smokeless tobacco: Never   Vaping Use    Vaping status: Never Used   Substance Use Topics    Alcohol use: Yes    Drug use: Not Currently     Social History     Substance and Sexual Activity   Sexual Activity Not Currently       Current Outpatient Medications   Medication Instructions    amitriptyline (ELAVIL) 50 mg, Oral, Daily at bedtime    amLODIPine (NORVASC) 5 mg, Oral, Daily    famotidine (PEPCID) 40 MG tablet take 1 tablet by mouth once daily for HEARTBURN AND BLOATING.  NEED TO MAKE AN APPOINTMENT FOR REFILLS    fluticasone (FLONASE) 50 mcg/act nasal spray 2 sprays, Nasal, Daily    gabapentin (NEURONTIN) 100 mg, 3 times daily PRN    ibuprofen (MOTRIN) 800 mg, 2 times daily PRN    losartan-hydrochlorothiazide (HYZAAR) 100-25 MG per tablet 1 tablet, Oral, Daily    predniSONE 10 mg tablet 40 mg daily for 3 days, then 30 mg daily for 3 days, then 20 mg daily for 3 days and 10 mg daily for 3 days and stop       History of Present Illness:   Patient presents with c/o dysuria for a couple of weeks.    Review of Systems:  Review of Systems   Genitourinary:  Positive for dysuria.   All other systems reviewed and are negative.      Physical Exam:  BP (!) 172/114 (BP Location: Left arm, Patient Position: Sitting, Cuff Size: Standard)   Pulse 102   Ht 5' 1\" (1.549 m)   Wt 100 kg (220 lb 12.8 oz)   BMI 41.72 kg/m²   Physical Exam  Constitutional:       " Appearance: Normal appearance.   Neurological:      Mental Status: She is alert.   Vitals and nursing note reviewed.           Assessment:   1. Dysuria    2. Hematuria   3. Chronic HTN    Plan:   1. Cultures ordered: urine   2. Macrobid/Pyridium   3. Referral to FM   4. Return to office prn.   5. Patient's depression screening was assessed with a PHQ-2 score of 0. Clinically patient does not have depression. No treatment is required.     Reviewed with patient that [facility], but that they will not necessarily be reviewed by me immediately.  Explained that I will review results at my earliest opportunity and contact patient appropriately.

## 2025-01-02 PROBLEM — J30.9 ALLERGIC RHINITIS: Status: ACTIVE | Noted: 2022-03-25

## 2025-01-02 PROBLEM — K59.00 CONSTIPATION: Status: ACTIVE | Noted: 2023-08-01

## 2025-01-02 PROBLEM — K42.9 UMBILICAL HERNIA: Status: ACTIVE | Noted: 2020-05-12

## 2025-01-02 PROBLEM — M54.50 CHRONIC BILATERAL LOW BACK PAIN WITHOUT SCIATICA: Status: ACTIVE | Noted: 2022-01-14

## 2025-01-02 PROBLEM — G89.29 CHRONIC BILATERAL LOW BACK PAIN WITHOUT SCIATICA: Status: ACTIVE | Noted: 2022-01-14

## 2025-01-02 PROBLEM — E66.813 CLASS 3 SEVERE OBESITY IN ADULT (HCC): Status: ACTIVE | Noted: 2019-11-25

## 2025-01-02 PROBLEM — R73.03 PREDIABETES: Status: ACTIVE | Noted: 2021-04-15

## 2025-01-02 PROBLEM — E66.01 CLASS 3 SEVERE OBESITY IN ADULT (HCC): Status: ACTIVE | Noted: 2019-11-25

## 2025-01-02 LAB
BACTERIA UR CULT: ABNORMAL
BACTERIA UR CULT: ABNORMAL

## 2025-01-07 ENCOUNTER — TELEPHONE (OUTPATIENT)
Dept: OBGYN CLINIC | Facility: CLINIC | Age: 50
End: 2025-01-07

## 2025-01-07 DIAGNOSIS — R30.0 DYSURIA: Primary | ICD-10-CM

## 2025-01-07 RX ORDER — AMOXICILLIN AND CLAVULANATE POTASSIUM 250; 125 MG/1; MG/1
1 TABLET, FILM COATED ORAL EVERY 12 HOURS SCHEDULED
Qty: 14 TABLET | Refills: 0 | Status: SHIPPED | OUTPATIENT
Start: 2025-01-07 | End: 2025-01-14

## 2025-01-07 NOTE — TELEPHONE ENCOUNTER
Patient came in stated she took   nitrofurantoin (MACROBID) 100 mg capsule  as indicated and has no relief. Patient asked if a different medication can be sent to [facility] [id number] [facility] [id number],

## 2025-01-08 ENCOUNTER — OFFICE VISIT (OUTPATIENT)
Dept: FAMILY MEDICINE CLINIC | Facility: CLINIC | Age: 50
End: 2025-01-08

## 2025-01-08 VITALS
HEART RATE: 96 BPM | OXYGEN SATURATION: 96 % | BODY MASS INDEX: 41.91 KG/M2 | DIASTOLIC BLOOD PRESSURE: 100 MMHG | SYSTOLIC BLOOD PRESSURE: 142 MMHG | WEIGHT: 222 LBS | HEIGHT: 61 IN | TEMPERATURE: 97.6 F | RESPIRATION RATE: 16 BRPM

## 2025-01-08 DIAGNOSIS — E66.813 CLASS 3 SEVERE OBESITY DUE TO EXCESS CALORIES WITHOUT SERIOUS COMORBIDITY WITH BODY MASS INDEX (BMI) OF 40.0 TO 44.9 IN ADULT (HCC): ICD-10-CM

## 2025-01-08 DIAGNOSIS — Z76.89 ENCOUNTER TO ESTABLISH CARE WITH NEW DOCTOR: Primary | ICD-10-CM

## 2025-01-08 DIAGNOSIS — N20.0 NEPHROLITHIASIS: ICD-10-CM

## 2025-01-08 DIAGNOSIS — E66.01 CLASS 3 SEVERE OBESITY DUE TO EXCESS CALORIES WITHOUT SERIOUS COMORBIDITY WITH BODY MASS INDEX (BMI) OF 40.0 TO 44.9 IN ADULT (HCC): ICD-10-CM

## 2025-01-08 DIAGNOSIS — Z11.59 NEED FOR HEPATITIS C SCREENING TEST: ICD-10-CM

## 2025-01-08 DIAGNOSIS — I10 ESSENTIAL HYPERTENSION: ICD-10-CM

## 2025-01-08 DIAGNOSIS — Z11.4 SCREENING FOR HIV (HUMAN IMMUNODEFICIENCY VIRUS): ICD-10-CM

## 2025-01-08 DIAGNOSIS — Z13.1 SCREENING FOR DIABETES MELLITUS: ICD-10-CM

## 2025-01-08 PROCEDURE — 99204 OFFICE O/P NEW MOD 45 MIN: CPT | Performed by: STUDENT IN AN ORGANIZED HEALTH CARE EDUCATION/TRAINING PROGRAM

## 2025-01-08 RX ORDER — AMLODIPINE BESYLATE 5 MG/1
5 TABLET ORAL DAILY
Qty: 30 TABLET | Refills: 2 | Status: SHIPPED | OUTPATIENT
Start: 2025-01-08

## 2025-01-08 RX ORDER — LOSARTAN POTASSIUM AND HYDROCHLOROTHIAZIDE 25; 100 MG/1; MG/1
1 TABLET ORAL DAILY
Qty: 30 TABLET | Refills: 11 | Status: SHIPPED | OUTPATIENT
Start: 2025-01-08 | End: 2026-01-08

## 2025-01-08 NOTE — ASSESSMENT & PLAN NOTE
Brief discussion on lifestyle management. Patient interested in medications but recommended to address lifestyle issues of poor diet, no exercise and poor sleep first for maximal success in weight loss journey. Will discuss further at follow visit in 6 weeks.

## 2025-01-08 NOTE — ASSESSMENT & PLAN NOTE
UTI currently being managed on augmentin after failing nitrofurantoin. Patient to  and start medications. History of renal calculi last seen on imaging 2022. Hx of ureteral stent placement. Consider use of tamsulosin and repeat imaging if UTI does not resolve. Urology referral/follow up once reestablished on insurance.

## 2025-01-08 NOTE — PROGRESS NOTES
Name: Sirena King      : 1975      MRN: [id number]  Encounter Provider: Wen Nelson MD  Encounter Date: 2025   Encounter department: [facility]    Assessment & Plan  Encounter to establish care with new doctor         Essential hypertension  Blood Pressure: 142/100   Well controlled on amlodipine 5mg daily, Hyzaar 100-25 daily  Continue management and with lifestyle management with LS diet and exercise of at least 150 min/week  Poor sleep and will discuss at follow up ways to adjust lifestyle to diminish stress  Orders:    amLODIPine (NORVASC) 5 mg tablet; Take 1 tablet (5 mg total) by mouth daily    losartan-hydrochlorothiazide (HYZAAR) 100-25 MG per tablet; Take 1 tablet by mouth daily    Need for hepatitis C screening test  No insurance, will order once covered.       Screening for HIV (human immunodeficiency virus)  No insursance, will order once covered.       Class 3 severe obesity due to excess calories without serious comorbidity with body mass index (BMI) of 40.0 to 44.9 in adult (HCC)  Brief discussion on lifestyle management. Patient interested in medications but recommended to address lifestyle issues of poor diet, no exercise and poor sleep first for maximal success in weight loss journey. Will discuss further at follow visit in 6 weeks.          Screening for diabetes mellitus  A1c today 6.0 - pre diabetes. Continue to monitor and lifestyle adjustment as noted above.        Nephrolithiasis  UTI currently being managed on augmentin after failing nitrofurantoin. Patient to  and start medications. History of renal calculi last seen on imaging . Hx of ureteral stent placement. Consider use of tamsulosin and repeat imaging if UTI does not resolve. Urology referral/follow up once reestablished on insurance.         BMI Counseling: Body mass index is 41.95 kg/m². The BMI is above normal. Nutrition recommendations include decreasing portion  "sizes, encouraging healthy choices of fruits and vegetables, decreasing fast food intake, limiting drinks that contain sugar, moderation in carbohydrate intake, increasing intake of lean protein, reducing intake of saturated and trans fat and reducing intake of cholesterol. Exercise recommendations include moderate physical activity 150 minutes/week, exercising 3-5 times per week and strength training exercises. Rationale for BMI follow-up plan is due to patient being overweight or obese.         History of Present Illness     Sirena King is a very pleasant 49 y.o. female who has a PMH of HTN, Pre-DM, obesity, GUILLE nephrolithiasis and presents today to establish with new doctor and review chronic diseases. Patient's chronic medical conditions are stable unless noted otherwise above. Patient reports compliance only on antihypertensive medications and recent abx for UTI. Reports nitrofurantoin not working for UTI and patient informed that OBGYN physician has sent Augmentin for culture directed adjusted UTI Therapy. She will  and start after this visit. This patient has had no admissions to hospital or medical emergencies since the last visit to our office. Patient has no further complaints other than what is mentioned below and in the ROS.    Patient currently self pay due to medicaid ineligibility but previously was insured.Followed up with financial counsellors and will reapply for medicaid. Agrees to hold off on routine counselling until covered.    Reports BP \"good\" today and has not yet taken medications due to rushing. Repeat in office same as documented. Patient reports systolic BP >180 and associated headaches. Disease education provided today regarding silent killer disease in HTN and encouraged daily ambulatory BP logs.     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 Sleeps " on avg 3 hours per day due to work restraints.         Review of Systems   Constitutional:  Positive for unexpected weight change (gain while not eatting much). Negative for chills and fever.   Cardiovascular:  Negative for chest pain and leg swelling.   Gastrointestinal:  Positive for abdominal pain. Negative for vomiting.   Genitourinary:  Positive for dysuria.   Musculoskeletal:  Negative for arthralgias and back pain.   Skin:  Negative for color change and rash.   Neurological:  Positive for headaches (when BP elevated).   Psychiatric/Behavioral:  Positive for sleep disturbance.    All other systems reviewed and are negative.    Past Medical History:   Diagnosis Date    Diabetes mellitus (HCC)     Hypertension      Past Surgical History:   Procedure Laterality Date    FL CYSTOGRAM  6/16/2022    FL CYSTOGRAM  6/9/2022    FL CYSTOGRAM  10/24/2018    FL CYSTOGRAM  10/10/2018    KIDNEY STONE SURGERY       Family History   Family history unknown: Yes     Social History     Tobacco Use    Smoking status: Never    Smokeless tobacco: Never   Vaping Use    Vaping status: Never Used   Substance and Sexual Activity    Alcohol use: Yes    Drug use: Not Currently    Sexual activity: Not Currently     Current Outpatient Medications on File Prior to Visit   Medication Sig    amoxicillin-clavulanate (AUGMENTIN) 250-125 mg per tablet Take 1 tablet by mouth every 12 (twelve) hours for 7 days    ibuprofen (MOTRIN) 800 mg tablet Take 1 tablet (800 mg total) by mouth 2 (two) times a day as needed for moderate pain    [DISCONTINUED] amitriptyline (ELAVIL) 50 mg tablet Take 1 tablet (50 mg total) by mouth daily at bedtime    [DISCONTINUED] amLODIPine (NORVASC) 5 mg tablet Take 1 tablet (5 mg total) by mouth daily    [DISCONTINUED] gabapentin (NEURONTIN) 100 mg capsule Take 100 mg by mouth as needed in the morning and 100 mg as needed at noon and 100 mg as needed in the evening.    [DISCONTINUED] predniSONE 10 mg tablet 40 mg daily for  "3 days, then 30 mg daily for 3 days, then 20 mg daily for 3 days and 10 mg daily for 3 days and stop    famotidine (PEPCID) 40 MG tablet take 1 tablet by mouth once daily for HEARTBURN AND BLOATING.  NEED TO MAKE AN APPOINTMENT FOR REFILLS (Patient not taking: Reported on 2024)    fluticasone (FLONASE) 50 mcg/act nasal spray 2 sprays into each nostril daily    [] nitrofurantoin (MACROBID) 100 mg capsule Take 1 capsule (100 mg total) by mouth 2 (two) times a day for 7 days    [DISCONTINUED] losartan-hydrochlorothiazide (HYZAAR) 100-25 MG per tablet Take 1 tablet by mouth in the morning.     Allergies   Allergen Reactions    Pollen Extract Allergic Rhinitis     Immunization History   Administered Date(s) Administered    COVID-19 PFIZER VACCINE 0.3 ML IM 2021, 03/10/2021     Objective   /100 (BP Location: Left arm, Patient Position: Sitting, Cuff Size: Large)   Pulse 96   Temp 97.6 °F (36.4 °C) (Temporal)   Resp 16   Ht 5' 1\" (1.549 m)   Wt 101 kg (222 lb)   LMP  (LMP Unknown)   SpO2 96%   BMI 41.95 kg/m²     Physical Exam  Vitals reviewed.   Constitutional:       Appearance: She is obese.   HENT:      Head: Normocephalic and atraumatic.      Nose: Nose normal.      Mouth/Throat:      Mouth: Mucous membranes are moist.      Pharynx: Oropharynx is clear.   Eyes:      Extraocular Movements: Extraocular movements intact.      Conjunctiva/sclera: Conjunctivae normal.      Pupils: Pupils are equal, round, and reactive to light.   Cardiovascular:      Rate and Rhythm: Normal rate and regular rhythm.      Pulses: Normal pulses.      Heart sounds: Normal heart sounds. No murmur heard.     No friction rub. No gallop.   Pulmonary:      Effort: Pulmonary effort is normal. No respiratory distress.      Breath sounds: Normal breath sounds. No wheezing, rhonchi or rales.   Chest:      Chest wall: No tenderness.   Abdominal:      General: Abdomen is flat. Bowel sounds are normal. There is no " distension.      Palpations: Abdomen is soft. There is no mass.      Tenderness: There is no abdominal tenderness.   Musculoskeletal:         General: Normal range of motion.      Cervical back: Normal range of motion.      Right lower leg: No edema.      Left lower leg: No edema.   Lymphadenopathy:      Cervical: No cervical adenopathy.   Skin:     General: Skin is warm and dry.      Findings: No rash.   Neurological:      General: No focal deficit present.      Mental Status: She is alert and oriented to person, place, and time.      Cranial Nerves: No cranial nerve deficit.      Motor: No weakness.      Coordination: Coordination normal.   Psychiatric:         Mood and Affect: Mood normal.         Behavior: Behavior normal.         Thought Content: Thought content normal.

## 2025-01-10 ENCOUNTER — TELEPHONE (OUTPATIENT)
Dept: FAMILY MEDICINE CLINIC | Facility: CLINIC | Age: 50
End: 2025-01-10

## 2025-01-10 NOTE — TELEPHONE ENCOUNTER
first attempt to contact patient. left message to return my call on answering machine.     Please assist in rescheduling 2/19/25 appt.

## 2025-02-10 PROBLEM — T83.718A EROSION OF BLADDER SUSPENSION MESH (HCC): Status: ACTIVE | Noted: 2022-05-25

## 2025-02-10 PROBLEM — E55.9 VITAMIN D DEFICIENCY: Status: ACTIVE | Noted: 2021-04-15

## 2025-02-10 PROBLEM — N39.0 RECURRENT UTI: Status: ACTIVE | Noted: 2018-04-10

## 2025-02-11 ENCOUNTER — OFFICE VISIT (OUTPATIENT)
Dept: OBGYN CLINIC | Facility: CLINIC | Age: 50
End: 2025-02-11

## 2025-02-11 VITALS — WEIGHT: 222 LBS | SYSTOLIC BLOOD PRESSURE: 150 MMHG | BODY MASS INDEX: 41.95 KG/M2 | DIASTOLIC BLOOD PRESSURE: 98 MMHG

## 2025-02-11 DIAGNOSIS — I10 ESSENTIAL HYPERTENSION: ICD-10-CM

## 2025-02-11 DIAGNOSIS — N39.0 RECURRENT UTI: Primary | ICD-10-CM

## 2025-02-11 DIAGNOSIS — T83.718S EROSION OF BLADDER SUSPENSION MESH, SEQUELA: ICD-10-CM

## 2025-02-11 LAB
SL AMB  POCT GLUCOSE, UA: ABNORMAL
SL AMB LEUKOCYTE ESTERASE,UA: ABNORMAL
SL AMB POCT BILIRUBIN,UA: ABNORMAL
SL AMB POCT BLOOD,UA: ABNORMAL
SL AMB POCT CLARITY,UA: ABNORMAL
SL AMB POCT COLOR,UA: YELLOW
SL AMB POCT KETONES,UA: ABNORMAL
SL AMB POCT NITRITE,UA: ABNORMAL
SL AMB POCT PH,UA: 6
SL AMB POCT SPECIFIC GRAVITY,UA: 1.02
SL AMB POCT URINE PROTEIN: ABNORMAL
SL AMB POCT UROBILINOGEN: ABNORMAL

## 2025-02-11 PROCEDURE — 87086 URINE CULTURE/COLONY COUNT: CPT

## 2025-02-11 PROCEDURE — 81003 URINALYSIS AUTO W/O SCOPE: CPT | Performed by: OBSTETRICS & GYNECOLOGY

## 2025-02-11 PROCEDURE — 99213 OFFICE O/P EST LOW 20 MIN: CPT | Performed by: OBSTETRICS & GYNECOLOGY

## 2025-02-11 NOTE — PROGRESS NOTES
Name: Sirena King      : 1975      MRN: [id number]  Encounter Provider: Asia Gunter MD  Encounter Date: 2025   Encounter department: [facility]  :  Assessment & Plan  Recurrent UTI  24: >100k klebsiella -> completed 7 day course of augmentin  Urine dip in office today with +leuks and 3+ blood  Will await repeat culture results for further management    Orders:    POCT urine dip auto non-scope    Urine culture    Hemoglobin A1C; Future    Erosion of bladder suspension mesh, sequela  Patient reports persistent issues with intercourse due to what sounds like residual suture/mesh material felt within the vaginal canal by both herself and her partner, beneath mucosa so not visible  Currently uninsured but interested in trying to fix this  Placed urogyn consult and specifically recommended seeing Dr. Sommers due to her extensive surgical history and complications    Orders:    Ambulatory Referral to Urogynecology; Future    Essential hypertension  Patient with persistently elevated BP despite taking multiple anti-hypertensives  Discussed with patient that I am NOT an expert in this subject area but that HTN can be caused by the kidneys  No documented workup or testing for RAAS changes  See below for orders for patient to consider if reasonable cost or able to get insurance, recommended also speaking to financial counselors about the referral      Orders:    US kidney and bladder; Future    Aldosterone/Renin Ratio; Future    Ambulatory Referral to Nephrology; Future        History of Present Illness   HPI  Sirena King is a 49 y.o. female who presents for UTI follow up. Her urine culture from 2024 grew Klebsiella and she completed a course of augmentin. Since then, she no longer has burning but still reports a foul smell to the urine.     Prior CS x4  : PV Sling, hysterectomy, endometrial ablation- Beni TOBAR   10/2006 subsequent urethral erosion- vaginal  repair and excision of eroded mesh  12/2006 removal of PVS and reconstruction.  5/22/2018- laser cystolithalopaxy, excision of bladder mesh  6/14/2018- residual mesh seen via cysto in office   9/28/2018- ex lap, excision of eroded bladder mesh, cystotomy closure   7/30/2020- ventral hernia repair with mesh  5/25/2022- robotic, laparoscopic, partial cystectomy, excision of eroded mesh, cystoscopy, left stent placement     Review of Systems   Constitutional: Negative.  Negative for chills and fever.   HENT: Negative.     Eyes: Negative.    Respiratory: Negative.  Negative for shortness of breath.    Cardiovascular: Negative.  Negative for chest pain.   Gastrointestinal: Negative.  Negative for abdominal pain.   Genitourinary: Negative.  Negative for pelvic pain, vaginal bleeding and vaginal discharge.   Musculoskeletal: Negative.    Neurological: Negative.    Psychiatric/Behavioral: Negative.     All other systems reviewed and are negative.      Current Outpatient Medications on File Prior to Visit   Medication Sig Dispense Refill    amLODIPine (NORVASC) 5 mg tablet Take 1 tablet (5 mg total) by mouth daily 30 tablet 2    ibuprofen (MOTRIN) 800 mg tablet Take 1 tablet (800 mg total) by mouth 2 (two) times a day as needed for moderate pain 90 tablet 2    losartan-hydrochlorothiazide (HYZAAR) 100-25 MG per tablet Take 1 tablet by mouth daily 30 tablet 11    famotidine (PEPCID) 40 MG tablet take 1 tablet by mouth once daily for HEARTBURN AND BLOATING.  NEED TO MAKE AN APPOINTMENT FOR REFILLS (Patient not taking: Reported on 2/11/2025)      fluticasone (FLONASE) 50 mcg/act nasal spray 2 sprays into each nostril daily (Patient not taking: Reported on 2/11/2025)       No current facility-administered medications on file prior to visit.      Social History     Tobacco Use    Smoking status: Never    Smokeless tobacco: Never   Vaping Use    Vaping status: Never Used   Substance and Sexual Activity    Alcohol use: Not  Currently    Drug use: Not Currently    Sexual activity: Yes     Partners: Male     Birth control/protection: Female Sterilization        Objective   /98 (BP Location: Left arm, Patient Position: Sitting, Cuff Size: Large)   Wt 101 kg (222 lb)   LMP  (LMP Unknown)   BMI 41.95 kg/m²      Physical Exam  Vitals reviewed.   Constitutional:       General: She is not in acute distress.     Appearance: She is obese.   HENT:      Mouth/Throat:      Mouth: Mucous membranes are moist.      Pharynx: Oropharynx is clear.   Cardiovascular:      Rate and Rhythm: Normal rate and regular rhythm.   Pulmonary:      Effort: Pulmonary effort is normal. No respiratory distress.   Abdominal:      General: Abdomen is flat. There is no distension.      Palpations: Abdomen is soft.      Tenderness: There is no abdominal tenderness.   Musculoskeletal:         General: Normal range of motion.   Skin:     General: Skin is warm and dry.   Neurological:      General: No focal deficit present.      Mental Status: She is alert and oriented to person, place, and time.   Psychiatric:         Mood and Affect: Mood normal.           Asia Gunter MD   OB/Gyn PGY-4  8:49 AM  02/12/25

## 2025-02-11 NOTE — ASSESSMENT & PLAN NOTE
12/31/24: >100k klebsiella -> completed 7 day course of augmentin  Urine dip in office today with +leuks and 3+ blood  Will await repeat culture results for further management    Orders:    POCT urine dip auto non-scope    Urine culture    Hemoglobin A1C; Future    
Patient reports persistent issues with intercourse due to what sounds like residual suture/mesh material felt within the vaginal canal by both herself and her partner, beneath mucosa so not visible  Currently uninsured but interested in trying to fix this  Placed urogyn consult and specifically recommended seeing Dr. Sommers due to her extensive surgical history and complications    Orders:    Ambulatory Referral to Urogynecology; Future    
Patient with persistently elevated BP despite taking multiple anti-hypertensives  Discussed with patient that I am NOT an expert in this subject area but that HTN can be caused by the kidneys  No documented workup or testing for RAAS changes  See below for orders for patient to consider if reasonable cost or able to get insurance, recommended also speaking to financial counselors about the referral      Orders:     kidney and bladder; Future    Aldosterone/Renin Ratio; Future    Ambulatory Referral to Nephrology; Future    
Bill For Surgical Tray: no
Billing Type: Third-Party Bill
Performing Laboratory: 0
Expected Date Of Service: 01/12/2024

## 2025-02-12 LAB — BACTERIA UR CULT: NORMAL

## 2025-02-13 ENCOUNTER — RESULTS FOLLOW-UP (OUTPATIENT)
Dept: OBGYN CLINIC | Facility: CLINIC | Age: 50
End: 2025-02-13

## 2025-02-26 ENCOUNTER — OFFICE VISIT (OUTPATIENT)
Dept: FAMILY MEDICINE CLINIC | Facility: CLINIC | Age: 50
End: 2025-02-26

## 2025-02-26 VITALS
HEIGHT: 61 IN | HEART RATE: 72 BPM | DIASTOLIC BLOOD PRESSURE: 94 MMHG | BODY MASS INDEX: 41.91 KG/M2 | TEMPERATURE: 97.8 F | RESPIRATION RATE: 18 BRPM | WEIGHT: 222 LBS | OXYGEN SATURATION: 96 % | SYSTOLIC BLOOD PRESSURE: 136 MMHG

## 2025-02-26 DIAGNOSIS — R30.0 DYSURIA: ICD-10-CM

## 2025-02-26 DIAGNOSIS — N76.0 ACUTE VAGINITIS: ICD-10-CM

## 2025-02-26 DIAGNOSIS — Z00.00 ANNUAL PHYSICAL EXAM: Primary | ICD-10-CM

## 2025-02-26 DIAGNOSIS — E66.813 CLASS 3 SEVERE OBESITY DUE TO EXCESS CALORIES WITHOUT SERIOUS COMORBIDITY WITH BODY MASS INDEX (BMI) OF 40.0 TO 44.9 IN ADULT (HCC): ICD-10-CM

## 2025-02-26 DIAGNOSIS — E66.01 CLASS 3 SEVERE OBESITY DUE TO EXCESS CALORIES WITHOUT SERIOUS COMORBIDITY WITH BODY MASS INDEX (BMI) OF 40.0 TO 44.9 IN ADULT (HCC): ICD-10-CM

## 2025-02-26 PROCEDURE — 87510 GARDNER VAG DNA DIR PROBE: CPT | Performed by: STUDENT IN AN ORGANIZED HEALTH CARE EDUCATION/TRAINING PROGRAM

## 2025-02-26 PROCEDURE — 87480 CANDIDA DNA DIR PROBE: CPT | Performed by: STUDENT IN AN ORGANIZED HEALTH CARE EDUCATION/TRAINING PROGRAM

## 2025-02-26 PROCEDURE — 87660 TRICHOMONAS VAGIN DIR PROBE: CPT | Performed by: STUDENT IN AN ORGANIZED HEALTH CARE EDUCATION/TRAINING PROGRAM

## 2025-02-26 PROCEDURE — 99396 PREV VISIT EST AGE 40-64: CPT | Performed by: STUDENT IN AN ORGANIZED HEALTH CARE EDUCATION/TRAINING PROGRAM

## 2025-02-26 NOTE — PATIENT INSTRUCTIONS
"Apps to track your meals:  My Fitness Pal  Cronometer - I personally prefer this one. Simpler to use    OR you can just write down your meals daily and bring in at your next visit.    Please see attached information for information on measurements of food with your hand. Otherwise, you can use standard measuring tools like spoons, cups and a food scale      Patient Education     Routine physical for adults   The Basics   Written by the doctors and editors at [facility]   What is a physical? -- A physical is a routine visit, or \"check-up,\" with your doctor. You might also hear it called a \"wellness visit\" or \"preventive visit.\"  During each visit, the doctor will:   Ask about your physical and mental health   Ask about your habits, behaviors, and lifestyle   Do an exam   Give you vaccines if needed   Talk to you about any medicines you take   Give advice about your health   Answer your questions  Getting regular check-ups is an important part of taking care of your health. It can help your doctor find and treat any problems you have. But it's also important for preventing health problems.  A routine physical is different from a \"sick visit.\" A sick visit is when you see a doctor because of a health concern or problem. Since physicals are scheduled ahead of time, you can think about what you want to ask the doctor.  How often should I get a physical? -- It depends on your age and health. In general, for people age 21 years and older:   If you are younger than 50 years, you might be able to get a physical every 3 years.   If you are 50 years or older, your doctor might recommend a physical every year.  If you have an ongoing health condition, like diabetes or high blood pressure, your doctor will probably want to see you more often.  What happens during a physical? -- In general, each visit will include:   Physical exam - The doctor or nurse will check your height, weight, heart rate, and blood pressure. They will also " "look at your eyes and ears. They will ask about how you are feeling and whether you have any symptoms that bother you.   Medicines - It's a good idea to bring a list of all the medicines you take to each doctor visit. Your doctor will talk to you about your medicines and answer any questions. Tell them if you are having any side effects that bother you. You should also tell them if you are having trouble paying for any of your medicines.   Habits and behaviors - This includes:   Your diet   Your exercise habits   Whether you smoke, drink alcohol, or use drugs   Whether you are sexually active   Whether you feel safe at home  Your doctor will talk to you about things you can do to improve your health and lower your risk of health problems. They will also offer help and support. For example, if you want to quit smoking, they can give you advice and might prescribe medicines. If you want to improve your diet or get more physical activity, they can help you with this, too.   Lab tests, if needed - The tests you get will depend on your age and situation. For example, your doctor might want to check your:   Cholesterol   Blood sugar   Iron level   Vaccines - The recommended vaccines will depend on your age, health, and what vaccines you already had. Vaccines are very important because they can prevent certain serious or deadly infections.   Discussion of screening - \"Screening\" means checking for diseases or other health problems before they cause symptoms. Your doctor can recommend screening based on your age, risk, and preferences. This might include tests to check for:   Cancer, such as breast, prostate, cervical, ovarian, colorectal, prostate, lung, or skin cancer   Sexually transmitted infections, such as chlamydia and gonorrhea   Mental health conditions like depression and anxiety  Your doctor will talk to you about the different types of screening tests. They can help you decide which screenings to have. They can " also explain what the results might mean.   Answering questions - The physical is a good time to ask the doctor or nurse questions about your health. If needed, they can refer you to other doctors or specialists, too.  Adults older than 65 years often need other care, too. As you get older, your doctor will talk to you about:   How to prevent falling at home   Hearing or vision tests   Memory testing   How to take your medicines safely   Making sure that you have the help and support you need at home  All topics are updated as new evidence becomes available and our peer review process is complete.  This topic retrieved from [employer] on: May 02, 2024.  Topic 920543 Version 1.0  Release: 32.4.3 - C32.122  © 2024 UpToDate, Inc. and/or its affiliates. All rights reserved.  Consumer Information Use and Disclaimer   Disclaimer: This generalized information is a limited summary of diagnosis, treatment, and/or medication information. It is not meant to be comprehensive and should be used as a tool to help the user understand and/or assess potential diagnostic and treatment options. It does NOT include all information about conditions, treatments, medications, side effects, or risks that may apply to a specific patient. It is not intended to be medical advice or a substitute for the medical advice, diagnosis, or treatment of a health care provider based on the health care provider's examination and assessment of a patient's specific and unique circumstances. Patients must speak with a health care provider for complete information about their health, medical questions, and treatment options, including any risks or benefits regarding use of medications. This information does not endorse any treatments or medications as safe, effective, or approved for treating a specific patient. UpToDate, Inc. and its affiliates disclaim any warranty or liability relating to this information or the use thereof.The use of this information is  governed by the Terms of Use, available at https://www.wolters"[employer]"uwer.com/en/know/clinical-effectiveness-terms. 2024© [employer] and its affiliates and/or licensors. All rights reserved.  Copyright   © 2024 [employer] and/or its affiliates. All rights reserved.

## 2025-02-26 NOTE — ASSESSMENT & PLAN NOTE
Prior Authorization Clinical Questions for Weight Management Pharmacotherapy    1. Does the patient have a contrainidcation to medication prescribed for weight management?: No  2. Does the patient have a diagnosis of obesity, confirmed by a BMI greater than or equal to 30 kg/m^2?: Yes  3. Does the patient have a BMI of greater than or equal to 27 kg/m^2 with at least one weight-related comorbidity/risk factor/complication (e.g. diabetes, dyslipidemia, coronary artery disease)?: Yes  4. Weight-related co-morbidities/risk factors: hypertension  5. WEGOVY CVA Indication: Does patient have established documented cardiovascular disease (history of a prior heart attack (myocardial infarction), stroke, or symptomatic peripheral arterial disease (PAD)?: No  6. ZEPBOUND GUILLE Indication: Does patient have documented GUILLE diagnosed via sleep study (insurance will require copy of sleep study results for approval)?: No  7. Has the patient been on a weight loss regimen of low-calorie diet, increased physical activity, and lifestyle modifications for a minimum of 6 months?: No  8. Has the patient completed a comprehensive weight loss program (ie, Weight Watchers, Noom, Bariatrics, other pierce on phone)? If so, what?: No  9. Does the patient have a history of type 2 diabetes?: No  10. Has the member tried and failed other weight loss medication within the past 12 months?: No  11. Will the member use requested medication in combination with another GLP agonist or weight loss drug?: No  12. Is the medication a controlled substance?: No     Baseline weight (in pounds): 222 lbs     Discussed as noted below. Options researched for out of pocket meds include zepbound at ~$250-500 per month. Patient hesitant due to needles but will inform via [employer] and allow time to consider and discuss at next follow up in 1 month. 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   - Food diary, increase intentional exercise recommended and will review x 1 month

## 2025-02-26 NOTE — PROGRESS NOTES
"Adult Annual Physical  Name: Sirena King      : 1975      MRN: [id number]  Encounter Provider: Wen Nelson MD  Encounter Date: 2025   Encounter department: [facility]    Assessment & Plan  Acute vaginitis    Orders:    Vaginosis DNA Probe; Future    Dysuria    Orders:    Vaginosis DNA Probe; Future    Immunizations and preventive care screenings were discussed with patient today. Appropriate education was printed on patient's after visit summary.    Counseling:  {Annual Physical; Counselin}         History of Present Illness   {?Quick Links Encounters * My Last Note * Last Note in Specialty * Snapshot * Since Last Visit * History :26351}  Adult Annual Physical  Review of Systems  {Select to Display PMH (Optional):94581}    Objective {?Quick Links Trend Vitals * Enter New Vitals * Results Review * Timeline (Adult) * Labs * Imaging * Cardiology * Procedures * Lung Cancer Screening * Surgical eConsent :30295}  /94 (BP Location: Right arm, Patient Position: Sitting, Cuff Size: Large)   Pulse 72   Temp 97.8 °F (36.6 °C) (Temporal)   Resp 18   Ht 5' 1\" (1.549 m)   Wt 101 kg (222 lb)   LMP  (LMP Unknown)   SpO2 96%   BMI 41.95 kg/m²     Physical Exam    "

## 2025-02-26 NOTE — PROGRESS NOTES
Adult Annual Physical  Name: Sirena King      : 1975      MRN: [id number]  Encounter Provider: Wen Nelson MD  Encounter Date: 2025   Encounter department: [facility]    Assessment & Plan  Acute vaginitis  Dysuria s/p antibiotics for UTI in 2024. Repeat UA and culture negative 2 weeks ago. Patient education provided. Foul odor and a slight burning developed recently. Self swab vaginosis probe completed in office today and will treat based on results.  Orders:    Vaginosis DNA Probe    Dysuria    Orders:    Vaginosis DNA Probe    Annual physical exam  Heavy focus today on weight management. Poor nutrition insight however difficult in specialty follow up due to self pay. Soon to go on medicaid and then may complete appropriate screenings and specialty follow ups.       Class 3 severe obesity due to excess calories without serious comorbidity with body mass index (BMI) of 40.0 to 44.9 in adult (HCC)  Prior Authorization Clinical Questions for Weight Management Pharmacotherapy    1. Does the patient have a contrainidcation to medication prescribed for weight management?: No  2. Does the patient have a diagnosis of obesity, confirmed by a BMI greater than or equal to 30 kg/m^2?: Yes  3. Does the patient have a BMI of greater than or equal to 27 kg/m^2 with at least one weight-related comorbidity/risk factor/complication (e.g. diabetes, dyslipidemia, coronary artery disease)?: Yes  4. Weight-related co-morbidities/risk factors: hypertension  5. WEGOVY CVA Indication: Does patient have established documented cardiovascular disease (history of a prior heart attack (myocardial infarction), stroke, or symptomatic peripheral arterial disease (PAD)?: No  6. ZEPBOUND GUILLE Indication: Does patient have documented GUILLE diagnosed via sleep study (insurance will require copy of sleep study results for approval)?: No  7. Has the patient been on a weight loss regimen of  low-calorie diet, increased physical activity, and lifestyle modifications for a minimum of 6 months?: No  8. Has the patient completed a comprehensive weight loss program (ie, Weight Watchers, Noom, Bariatrics, other pierce on phone)? If so, what?: No  9. Does the patient have a history of type 2 diabetes?: No  10. Has the member tried and failed other weight loss medication within the past 12 months?: No  11. Will the member use requested medication in combination with another GLP agonist or weight loss drug?: No  12. Is the medication a controlled substance?: No     Baseline weight (in pounds): 222 lbs     Discussed as noted below. Options researched for out of pocket meds include zepbound at ~$250-500 per month. Patient hesitant due to needles but will inform via [employer] and allow time to consider and discuss at next follow up in 1 month. 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   - Food diary, increase intentional exercise recommended and will review x 1 month           Immunizations and preventive care screenings were discussed with patient today. Appropriate education was printed on patient's after visit summary.    Counseling:  Alcohol/drug use: discussed moderation in alcohol intake, the recommendations for healthy alcohol use, and avoidance of illicit drug use.  Dental Health: discussed importance of regular tooth brushing, flossing, and dental visits.  Sexual health: discussed sexually transmitted diseases  Exercise: the importance of regular exercise/physical activity was discussed.    BMI Counseling: Body mass index is 41.95 kg/m². The BMI is above normal. Nutrition recommendations include decreasing portion sizes, encouraging healthy choices of fruits and vegetables, decreasing fast food intake and consuming healthier snacks. Exercise recommendations  include strength training exercises. No pharmacotherapy was ordered. Rationale for BMI follow-up plan is due to patient being overweight or obese. Patient interested in bariatric surgery however no significant trial at lifestyle modifications or weight loss pharm therapy tried. Education and extensive counselling provided today. Recommended food diary or pierce food tracking to establish norms and adjust from there as patient has difficulty recalling nutrition patterns due to hectic lifestyle, financial constraints and poor nutrition education or willingness to try other habits. Welcomes education though and we will try small habit changes to built persistence and sustenance.        History of Present Illness     Adult Annual Physical:  Patient presents for annual physical.     Diet and Physical Activity:  - Diet/Nutrition: poor diet. some fast food and eatting in late hours due to working overnight. difficult to get 24 hr recall, eats 1-2 large meals per day.  - Exercise: no formal exercise.    General Health:  - Sleep: sleeps poorly.  - Dental: no dental visits for > 1 year. appt in March 2025    Review of Systems   Constitutional:  Negative for chills, fever and unexpected weight change.   HENT:  Negative for ear pain and sore throat.    Eyes:  Negative for pain and visual disturbance.   Respiratory:  Negative for cough and shortness of breath.    Cardiovascular:  Negative for chest pain, palpitations and leg swelling.   Gastrointestinal:  Negative for abdominal pain, blood in stool, constipation, diarrhea, nausea and vomiting.   Genitourinary:  Positive for dysuria. Negative for hematuria, vaginal bleeding, vaginal discharge and vaginal pain.   Musculoskeletal:  Negative for arthralgias and back pain.   Skin:  Negative for color change and rash.   Neurological:  Positive for headaches (when fatigued or BP high although this is not checked in the moment). Negative for syncope and light-headedness.  "  Psychiatric/Behavioral:  Positive for sleep disturbance.    All other systems reviewed and are negative.    Medical History Reviewed by provider this encounter:     .    Objective   /94 (BP Location: Right arm, Patient Position: Sitting, Cuff Size: Large)   Pulse 72   Temp 97.8 °F (36.6 °C) (Temporal)   Resp 18   Ht 5' 1\" (1.549 m)   Wt 101 kg (222 lb)   LMP  (LMP Unknown)   SpO2 96%   BMI 41.95 kg/m²     Physical Exam  Vitals reviewed.   Constitutional:       Appearance: She is obese.   HENT:      Head: Normocephalic and atraumatic.      Right Ear: Tympanic membrane, ear canal and external ear normal.      Left Ear: Tympanic membrane, ear canal and external ear normal.      Nose: Nose normal.      Mouth/Throat:      Mouth: Mucous membranes are moist.   Eyes:      Conjunctiva/sclera: Conjunctivae normal.   Cardiovascular:      Rate and Rhythm: Normal rate and regular rhythm.      Pulses: Normal pulses.      Heart sounds: Normal heart sounds. No murmur heard.  Pulmonary:      Effort: Pulmonary effort is normal.      Breath sounds: No wheezing, rhonchi or rales.   Abdominal:      General: Abdomen is flat. Bowel sounds are normal.      Palpations: Abdomen is soft.      Tenderness: There is no abdominal tenderness.   Musculoskeletal:         General: Normal range of motion.      Cervical back: Normal range of motion.      Right lower leg: No edema.      Left lower leg: No edema.   Lymphadenopathy:      Cervical: No cervical adenopathy.   Skin:     General: Skin is warm and dry.      Findings: No rash.   Neurological:      General: No focal deficit present.      Mental Status: She is alert.   Psychiatric:         Mood and Affect: Mood normal.         Behavior: Behavior normal.       "

## 2025-02-27 LAB
CANDIDA RRNA VAG QL PROBE: NOT DETECTED
G VAGINALIS RRNA GENITAL QL PROBE: DETECTED
T VAGINALIS RRNA GENITAL QL PROBE: NOT DETECTED

## 2025-03-03 ENCOUNTER — RESULTS FOLLOW-UP (OUTPATIENT)
Dept: FAMILY MEDICINE CLINIC | Facility: CLINIC | Age: 50
End: 2025-03-03

## 2025-03-03 DIAGNOSIS — N76.0 BV (BACTERIAL VAGINOSIS): Primary | ICD-10-CM

## 2025-03-03 DIAGNOSIS — B96.89 BV (BACTERIAL VAGINOSIS): Primary | ICD-10-CM

## 2025-03-03 RX ORDER — METRONIDAZOLE 500 MG/1
500 TABLET ORAL EVERY 12 HOURS SCHEDULED
Qty: 14 TABLET | Refills: 0 | Status: SHIPPED | OUTPATIENT
Start: 2025-03-03 | End: 2025-03-10

## 2025-03-12 PROBLEM — N39.0 RECURRENT UTI: Status: RESOLVED | Noted: 2018-04-10 | Resolved: 2025-03-12

## 2025-05-21 ENCOUNTER — OFFICE VISIT (OUTPATIENT)
Dept: FAMILY MEDICINE CLINIC | Facility: CLINIC | Age: 50
End: 2025-05-21

## 2025-05-21 ENCOUNTER — OFFICE VISIT (OUTPATIENT)
Dept: DENTISTRY | Facility: CLINIC | Age: 50
End: 2025-05-21

## 2025-05-21 VITALS — SYSTOLIC BLOOD PRESSURE: 171 MMHG | DIASTOLIC BLOOD PRESSURE: 122 MMHG | HEART RATE: 87 BPM

## 2025-05-21 DIAGNOSIS — I10 ESSENTIAL HYPERTENSION: ICD-10-CM

## 2025-05-21 DIAGNOSIS — K08.409 PARTIAL EDENTULISM, UNSPECIFIED EDENTULISM CLASS: Primary | ICD-10-CM

## 2025-05-21 DIAGNOSIS — R39.9 UTI SYMPTOMS: Primary | ICD-10-CM

## 2025-05-21 LAB
SL AMB  POCT GLUCOSE, UA: ABNORMAL
SL AMB LEUKOCYTE ESTERASE,UA: ABNORMAL
SL AMB POCT BILIRUBIN,UA: ABNORMAL
SL AMB POCT BLOOD,UA: ABNORMAL
SL AMB POCT CLARITY,UA: CLEAR
SL AMB POCT COLOR,UA: YELLOW
SL AMB POCT KETONES,UA: ABNORMAL
SL AMB POCT NITRITE,UA: ABNORMAL
SL AMB POCT PH,UA: 6
SL AMB POCT SPECIFIC GRAVITY,UA: 1.01
SL AMB POCT URINE PROTEIN: ABNORMAL
SL AMB POCT UROBILINOGEN: ABNORMAL

## 2025-05-21 PROCEDURE — D0220 INTRAORAL - PERIAPICAL FIRST RADIOGRAPHIC IMAGE: HCPCS | Performed by: DENTIST

## 2025-05-21 PROCEDURE — 99214 OFFICE O/P EST MOD 30 MIN: CPT | Performed by: STUDENT IN AN ORGANIZED HEALTH CARE EDUCATION/TRAINING PROGRAM

## 2025-05-21 PROCEDURE — 81003 URINALYSIS AUTO W/O SCOPE: CPT | Performed by: STUDENT IN AN ORGANIZED HEALTH CARE EDUCATION/TRAINING PROGRAM

## 2025-05-21 PROCEDURE — 87077 CULTURE AEROBIC IDENTIFY: CPT | Performed by: STUDENT IN AN ORGANIZED HEALTH CARE EDUCATION/TRAINING PROGRAM

## 2025-05-21 PROCEDURE — 87086 URINE CULTURE/COLONY COUNT: CPT | Performed by: STUDENT IN AN ORGANIZED HEALTH CARE EDUCATION/TRAINING PROGRAM

## 2025-05-21 PROCEDURE — D0140 LIMITED ORAL EVALUATION - PROBLEM FOCUSED: HCPCS | Performed by: DENTIST

## 2025-05-21 RX ORDER — AMLODIPINE BESYLATE 5 MG/1
10 TABLET ORAL DAILY
Qty: 30 TABLET | Refills: 2 | Status: SHIPPED | OUTPATIENT
Start: 2025-05-21

## 2025-05-21 RX ORDER — ACETAMINOPHEN 500 MG
1000 TABLET ORAL ONCE
Status: COMPLETED | OUTPATIENT
Start: 2025-05-21 | End: 2025-05-21

## 2025-05-21 RX ADMIN — Medication 1000 MG: at 12:14

## 2025-05-21 NOTE — PROGRESS NOTES
"CC- pt states\"my front tooth on her upper partial is not even.\"  Pain Scale  Asa II  *reviewed med hx- Pt with HTN-has elevated BP and reports just got up and did not take her BP med. Dicussed referral back to PCP for evaluation of elevated BP.  Pt has not been here since 2023-states she has a very hard time getting an appt.  NOTE: no acute distress; states tooth has been like this for some time but had difficulty making an appt.  PA #8- previous RCT/cronw intact; no PAP or sign of infection. Minial bone loss noted.  Ex- soft tissue: neg.       - Max flipper fits well but tooth #9 is not aligned with other teeth; mostly likely due to continued healing of previously extracted tooth yrs ago.  Sent back to PCP for evaluation BP.  NV- 1) Reline max flipper/add acryllic to #9         2) cleaning, probpings and comp ex/radiographs updated.  "

## 2025-05-21 NOTE — LETTER
May 25, 2025       Patient: Sirena King   YOB: 1975   Date of Visit: 5/21/2025       To Whom It May Concern:    This letter serves as notice that Sirena King is under my care and currently being managed for Hypertension that is poorly controlled. In addition to this, I also manage her for Pre-diabetes, Severe Obesity, Obstructive Sleep Apnea and Kidney Stones. She direly needs medical insurance coverage to ensure that evaluation and management of these diseases are instituted promptly and judiciously, in order to minimize the significant risk of damage they pose to her overall health status. Any assistance you can provide to help her in this need would be most beneficial to her health and greatly appreciated.     If you have questions, please do not hesitate to call me.          Sincerely,      Wen Nelson MD

## 2025-05-21 NOTE — Clinical Note
Hey team, please let patient know that letter for medical insurance is available for her via [employer] or to be printed and picked up in office. I believe she stated that she prefers phone calls rather than utilizing MyChart. Thank you!

## 2025-05-22 LAB
BACTERIA UR CULT: ABNORMAL
BACTERIA UR CULT: ABNORMAL

## 2025-05-23 ENCOUNTER — RESULTS FOLLOW-UP (OUTPATIENT)
Dept: FAMILY MEDICINE CLINIC | Facility: CLINIC | Age: 50
End: 2025-05-23

## 2025-05-23 DIAGNOSIS — R39.9 UTI SYMPTOMS: Primary | ICD-10-CM

## 2025-05-23 RX ORDER — NITROFURANTOIN 25; 75 MG/1; MG/1
100 CAPSULE ORAL 2 TIMES DAILY
Qty: 10 CAPSULE | Refills: 0 | Status: SHIPPED | OUTPATIENT
Start: 2025-05-23 | End: 2025-05-23

## 2025-05-23 RX ORDER — NITROFURANTOIN 25; 75 MG/1; MG/1
100 CAPSULE ORAL 2 TIMES DAILY
Qty: 10 CAPSULE | Refills: 0 | Status: SHIPPED | OUTPATIENT
Start: 2025-05-23 | End: 2025-05-28

## 2025-05-23 NOTE — ASSESSMENT & PLAN NOTE
"BP Readings from Last 3 Encounters:   05/21/25 (!) 152/120   05/21/25 (!) 171/122   02/26/25 136/94   Uncontrolled  /120 initially in office visit today. Repeat not much improved to 152/120 manual. Compliant on medications and low salt recommendations. HIGH stressors in home with family issues, financial constraints and dependence. Does not have machine at home and does not check BP for log as advised. Will refer to Ambulatory Self Monitoring BP program with clinical pharmacist - patient agreed. Negative work up for secondary htn. Mild headache during this visit but otherwise asymptomatic and no obvious sign of ongoing end organ damage  - Discussed importance and methods of self preservation and stress reductions techniques though difficult given type of stressors in home.   - will increase amlodipine to 10mg daily starting today - advised to observe for side effects of pedal edema  - Tylenol 1g po given in office which usually helps her \"HTN\" headaches  Orders:    acetaminophen (TYLENOL) tablet 1,000 mg    amLODIPine (NORVASC) 5 mg tablet; Take 2 tablets (10 mg total) by mouth daily    Ambulatory referral to clinical pharmacy; Future    "

## 2025-05-23 NOTE — ASSESSMENT & PLAN NOTE
Blood Pressure: (!) 160/120   Uncontrolled today on amlodipine 5mg daily, Hyzaar 100-25 daily but reports rushing for appt  Continue management and with lifestyle management with LS diet and exercise of at least 150 min/week  Poor sleep and will discuss at follow up ways to adjust lifestyle to diminish stress  SmartLink not supported outside of the Encounter Diagnoses SmartSection.

## 2025-05-23 NOTE — TELEPHONE ENCOUNTER
Patient called and discussed urine culture positive for aerococcus urinae. Symptoms persist and therefore will treat with nitrofurantoin bid x 5 days. Patient advised anti-UTI measures and will follow up as previously planned for ABPM program next week.
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
## 2025-05-25 VITALS
DIASTOLIC BLOOD PRESSURE: 120 MMHG | SYSTOLIC BLOOD PRESSURE: 152 MMHG | HEART RATE: 89 BPM | BODY MASS INDEX: 41.54 KG/M2 | WEIGHT: 220 LBS | OXYGEN SATURATION: 95 % | RESPIRATION RATE: 16 BRPM | HEIGHT: 61 IN | TEMPERATURE: 97.8 F

## 2025-05-25 NOTE — ASSESSMENT & PLAN NOTE
Blood Pressure: 142/100   Well controlled on amlodipine 5mg daily, Hyzaar 100-25 daily  Continue management and with lifestyle management with LS diet and exercise of at least 150 min/week  Poor sleep and will discuss at follow up ways to adjust lifestyle to diminish stress  Orders:    amLODIPine (NORVASC) 5 mg tablet; Take 1 tablet (5 mg total) by mouth daily    losartan-hydrochlorothiazide (HYZAAR) 100-25 MG per tablet; Take 1 tablet by mouth daily     MSK XR negative - No fracture, No dislocation, No foreign body

## 2025-06-02 ENCOUNTER — OFFICE VISIT (OUTPATIENT)
Dept: FAMILY MEDICINE CLINIC | Facility: CLINIC | Age: 50
End: 2025-06-02

## 2025-06-02 DIAGNOSIS — I10 ESSENTIAL HYPERTENSION: Primary | ICD-10-CM

## 2025-06-02 PROCEDURE — PBNCHG PB NO CHARGE PLACEHOLDER: Performed by: PHARMACIST

## 2025-06-02 NOTE — PROGRESS NOTES
[facility] Clinical Pharmacy Services  Marcelino Javier, PharmD, BCACP    REASON FOR VISIT: Appt with 50 y.o. year old for HTN.     Current Hypertension Regimen:  Amlodipine 5 mg - take 2 tablets daily   Losartan/HCTZ 100mg/25mg - take by mouth once daily     ASSESSMENT/PLAN:                                                                                                            Assessment & Plan  Essential hypertension  Above goal, bp 144/86 hr 86 in clinic today    Start home bp monitoring therapy     Future: consider spironolactone. K is borderline low most recently. GFR 78       156/107 hR 88    144/86 HR 86 - dinamap    163/101 93  Hypertension Details  BP goal of less loms137/80 per 2017 ACC/AHA guidelines ideally, or <140/90 minimally at clinic  Discussed in great detail potential consequences of uncontrolled hypertension including increased risk of ASCVD and death.  Discussed the impact of sodium on BP, discussed a goal of < 1500 mg of sodium daily - discussed focusing on increased intake of fresh fruit and veggies   Clinic BP above goal, HR acceptable  Serum K = see above       Updated HTN Regimen:   No change today    Orders placed under CPA    Future: spironolactone, then maybe beta blocker if needed?    Home Monitoring: starting today     Instructions on HBPM procedures:  Remain still:  Avoid smoking, caffeinated beverages, or exercise within 30 min before BP measurements.  Ensure at least 5 min of quiet rest before BP measurements.  Sit correctly:  Sit with back straight and supported (on a straight-backed chair vs a sofa).  Sit with feet flat on the floor and legs uncrossed.  Keep arm supported on a flat surface (such as a table), with the upper arm at heart level.  Bottom of the cuff should be placed directly above the bend of the elbow.   Take multiple readings:  Take at least 2 readings 1 min apart in morning before taking medications and in evening before supper.  Optimally, measure and record BP  twice daily, Reviewed detailed twice daily blood pressure log provided to patient.    BP Thresholds counseling:  Counseled on hypertensive urgency. Patient is aware if BP is >180/110, and symptoms are experienced such as: severe headache, chest pain, shortness of breath, or any neurological symptoms such as weakness or difficulty speaking, seek immediate medical attention as these may indicate a hypertensive emergency.     Counseled on symptomatic hypotension: Patient is aware if BP is < 90/65, and symptoms are experienced such as: syncope, chest pain, dyspnea, cool/clammy skin, altered level of consciousness, or decreased urine output, which require urgent evaluation and treatment as these are signs of possible shock.     Labs: repeat bmp in near future     Follow-Up: 2 weeks with bp log        SUBJECTIVE:                                                                                                                         Pt presents for hypertension management visit     1. Medication adherence: Medication list reviewed with patient, reports the following discrepancies/problems:  amLODIPine  losartan-hydrochlorothiazide      Taking medications as prescribed: yes - no AE  Took all medications today: yes - confirmed    2. Medication Tolerance/Efficacy:   Review of Systems    Home blood pressure Cuff Brand: RnM  Brought in home cuff for calibration: yes - cuff reads 10-20 mmHg higher than dinamap meter used in clinic today - arm was measured in clinic and fits. Meter just reads falsely high it appears - take this into account and always check in clinic              3. Lifestyle:         Sodium intake salty foods: consumed at high rate and salt added to cooking --- uses adobo for almost all foods. Chicken, rice, etc. Discussed lmiting this and sub with low sodium options            Caffeinated Beverages: coffee  6 to 18 ounces - this morning      Caffeine prior to today's appt: YES-coffee  6 to 18 ounces                   Exercise: none, works often. No time for this    Sleep - not sleeping enough, only about 4-5 hours per day. Disucssed this adds to HTN and pt needs 7-9 hours of sleep daily           Tobacco use: Tobacco Use History[1]        Alcohol use:     Social History     Substance and Sexual Activity   Alcohol Use Not Currently       OBJECTIVE:                                                                                                                                Previous anti-hypertensive agents tried:  None     BP Readings from Last 3 Encounters:   05/21/25 (!) 152/120   05/21/25 (!) 171/122   02/26/25 136/94       Pulse Readings from Last 3 Encounters:   05/21/25 89   05/21/25 87   02/26/25 72       PERTINENT INTERIM LAB:  Lab Results   Component Value Date    SODIUM 137 01/22/2024    K 3.3 (L) 01/22/2024    CL 98 (L) 01/22/2024    CO2 30 01/22/2024    BUN 7 01/22/2024    CREATININE 0.90 01/22/2024    GLUC 107 (H) 01/22/2024    CALCIUM 9.4 01/22/2024       M*Modal Dragon software was used to dictate this note. It may contain errors with dictating incorrect words or incorrect spelling. Please contact the provider directly with any questions. Thank you for your understanding.    Pharmacist Tracking Tool  Reason For Outreach: Embedded Pharmacist  Demographics:  Intervention Method: In Person  Type of Intervention: New  Topics Addressed: Hypertension  Pharmacologic Interventions: Prevent or Manage LETICIA  Non-Pharmacologic Interventions: Chart update, Disease state education, Medication Monitoring, and Medication/Device education  Time:  Direct Patient Care: 35 mins   Care Coordination: 10 mins  Recommendation Recipient: Patient/Caregiver  Outcome: Accepted         [1]   Social History  Tobacco Use   Smoking Status Never    Passive exposure: Never   Smokeless Tobacco Never

## 2025-06-02 NOTE — ASSESSMENT & PLAN NOTE
Above goal, bp 144/86 hr 86 in clinic today    Start home bp monitoring therapy     Future: consider spironolactone. K is borderline low most recently. GFR 78

## 2025-06-03 VITALS — SYSTOLIC BLOOD PRESSURE: 156 MMHG | DIASTOLIC BLOOD PRESSURE: 107 MMHG

## 2025-06-19 ENCOUNTER — TELEPHONE (OUTPATIENT)
Dept: FAMILY MEDICINE CLINIC | Facility: CLINIC | Age: 50
End: 2025-06-19

## 2025-06-19 NOTE — TELEPHONE ENCOUNTER
No Show for home monitoring BP f/u visit today    Left detailed msg explaining purpose of visit and requested pt resched    Can sched via video if that is easier also    Fyi to pcp    Marcelino Javier, PharmD, BCACP  [facility] Clinical Pharmacist

## 2025-07-07 ENCOUNTER — OFFICE VISIT (OUTPATIENT)
Dept: DENTISTRY | Facility: CLINIC | Age: 50
End: 2025-07-07

## 2025-07-07 VITALS — DIASTOLIC BLOOD PRESSURE: 117 MMHG | SYSTOLIC BLOOD PRESSURE: 161 MMHG

## 2025-07-07 DIAGNOSIS — Z01.20 ENCOUNTER FOR DENTAL EXAMINATION AND CLEANING WITHOUT ABNORMAL FINDINGS: Primary | ICD-10-CM

## 2025-07-07 PROCEDURE — D0191 ASSESSMENT OF A PATIENT: HCPCS

## 2025-07-08 NOTE — PROGRESS NOTES
Assessment for UPD fabrication/ repair    Patient presents at [facility]     REV MED HX: reviewed medical history, meds and allergies in EPIC  CHIEF CONCERN:  * pt would like acrylic added to tooth #9 on UPD. Refused hygiene visit   ASA class:  ASA 2 - Patient with mild systemic disease with no functional limitations  PAIN SCALE:  0      Visual and Tactile Intraoral/Extraoral Evaluation:   Oral and Oropharyngeal cancer evaluation performed. No findings.    REFERRALS: none    FINDINGS: pt refused p chart, xrays and prophy for upper teeth. She would like to focus on repairing her UPD. Pt reports #9 incisal dge much shorter than #8- has gotten shorter over time, denture fit well for 1 phillip after ext in 2023.  Discussed making a new denture, bridge 8-10 or replacing #9 with an implant . Pt interested in consult for implant at [place] in the near future        NEXT VISIT:    Reline max flipper/add acryllic to #9   2. cleaning, probings and comp ex/radiographs updated.   3.    Next Hygiene Visit :    p chart, 4bws, prophy      Last BWX taken:   Last Panorex:

## 2025-07-28 DIAGNOSIS — R82.90 FOUL SMELLING URINE: Primary | ICD-10-CM

## 2025-08-20 ENCOUNTER — OFFICE VISIT (OUTPATIENT)
Dept: DENTISTRY | Facility: CLINIC | Age: 50
End: 2025-08-20

## 2025-08-20 ENCOUNTER — OFFICE VISIT (OUTPATIENT)
Dept: OBGYN CLINIC | Facility: CLINIC | Age: 50
End: 2025-08-20

## 2025-08-20 VITALS — BODY MASS INDEX: 42.7 KG/M2 | WEIGHT: 226 LBS | SYSTOLIC BLOOD PRESSURE: 140 MMHG | DIASTOLIC BLOOD PRESSURE: 80 MMHG

## 2025-08-20 VITALS — DIASTOLIC BLOOD PRESSURE: 93 MMHG | HEART RATE: 89 BPM | SYSTOLIC BLOOD PRESSURE: 144 MMHG

## 2025-08-20 DIAGNOSIS — R30.0 DYSURIA: Primary | ICD-10-CM

## 2025-08-20 DIAGNOSIS — N30.01 ACUTE CYSTITIS WITH HEMATURIA: ICD-10-CM

## 2025-08-20 DIAGNOSIS — K08.409 PARTIAL EDENTULISM, UNSPECIFIED EDENTULISM CLASS: Primary | ICD-10-CM

## 2025-08-20 LAB
SL AMB  POCT GLUCOSE, UA: NEGATIVE
SL AMB LEUKOCYTE ESTERASE,UA: ABNORMAL
SL AMB POCT BILIRUBIN,UA: NEGATIVE
SL AMB POCT BLOOD,UA: ABNORMAL
SL AMB POCT CLARITY,UA: ABNORMAL
SL AMB POCT COLOR,UA: ABNORMAL
SL AMB POCT KETONES,UA: NEGATIVE
SL AMB POCT NITRITE,UA: ABNORMAL
SL AMB POCT PH,UA: 6
SL AMB POCT SPECIFIC GRAVITY,UA: 1.01
SL AMB POCT URINE PROTEIN: NEGATIVE
SL AMB POCT UROBILINOGEN: 0.2

## 2025-08-20 PROCEDURE — 81003 URINALYSIS AUTO W/O SCOPE: CPT | Performed by: NURSE PRACTITIONER

## 2025-08-20 PROCEDURE — 99213 OFFICE O/P EST LOW 20 MIN: CPT | Performed by: NURSE PRACTITIONER

## 2025-08-20 PROCEDURE — WIS5009 POST-OP DENTURE ADJUSTMENT: Performed by: DENTIST

## 2025-08-20 RX ORDER — NITROFURANTOIN 25; 75 MG/1; MG/1
100 CAPSULE ORAL 2 TIMES DAILY
Qty: 10 CAPSULE | Refills: 0 | Status: SHIPPED | OUTPATIENT
Start: 2025-08-20 | End: 2025-08-25

## 2025-08-22 LAB
BACTERIA UR CULT: ABNORMAL
BACTERIA UR CULT: ABNORMAL